# Patient Record
Sex: FEMALE | Employment: FULL TIME | URBAN - METROPOLITAN AREA
[De-identification: names, ages, dates, MRNs, and addresses within clinical notes are randomized per-mention and may not be internally consistent; named-entity substitution may affect disease eponyms.]

---

## 2022-06-06 NOTE — PROGRESS NOTES
The patient is a 23 y.o. No obstetric history on file. who is seen for STD screening. Patient has no symptoms and no known exposure. She states she prefers to have regular screenings every three out of caution. HISTORY:    No obstetric history on file. Last LMP: 5/22/2022  Sexual History:  1 partner in the last three months  Contraception:  Not taking birth control  Current Outpatient Medications on File Prior to Visit   Medication Sig Dispense Refill    acyclovir (ZOVIRAX) 400 MG tablet Take 400 mg by mouth 2 times daily       No current facility-administered medications on file prior to visit. ROS:  Feeling well. No dyspnea or chest pain on exertion. No abdominal pain, change in bowel habits, black or bloody stools. No urinary tract symptoms. GYN ROS: see above. PHYSICAL EXAM:  Blood pressure 118/74, height 5' 7\" (1.702 m), weight 155 lb 12.8 oz (70.7 kg), last menstrual period 05/22/2022. The patient appears well, alert, oriented x 3, in no distress. Lungs are clear. Heart RRR, no murmurs. Abdomen soft without tenderness, guarding, mass or organomegaly. Pelvic: normal external genitalia, vulva, vagina, cervix, uterus and adnexa, VULVA: normal appearing vulva with no masses, tenderness or lesions, VAGINA: normal appearing vagina with normal color and discharge, no lesions, CERVIX: normal appearing cervix without discharge or lesions. ASSESSMENT:  Encounter Diagnosis   Name Primary?     Screening examination for STD (sexually transmitted disease) Yes       PLAN:  All questions answered  Swab for screening  Would like blood testing also  Will call pt with results and manage accordingly         Orders Placed This Encounter   Procedures    Nuswab Vaginitis Plus (VG+)     Standing Status:   Future     Number of Occurrences:   1     Standing Expiration Date:   6/7/2023    Hepatitis B Surface Antigen     Standing Status:   Future     Number of Occurrences:   1     Standing Expiration Date: 6/7/2023    Hepatitis C Antibody     Standing Status:   Future     Number of Occurrences:   1     Standing Expiration Date:   6/7/2023    HIV 1/2 Ag/Ab, 4TH Generation,W Rflx Confirm     Standing Status:   Future     Number of Occurrences:   1     Standing Expiration Date:   6/7/2023    RPR     Standing Status:   Future     Number of Occurrences:   1     Standing Expiration Date:   6/7/2023         Supervising physician is Dr. Nicolle Humphries than 50% of the 15 minute visit were spent in counseling to the above topics.

## 2022-06-07 ENCOUNTER — OFFICE VISIT (OUTPATIENT)
Dept: OBGYN CLINIC | Age: 27
End: 2022-06-07
Payer: COMMERCIAL

## 2022-06-07 VITALS
BODY MASS INDEX: 24.45 KG/M2 | DIASTOLIC BLOOD PRESSURE: 74 MMHG | SYSTOLIC BLOOD PRESSURE: 118 MMHG | HEIGHT: 67 IN | WEIGHT: 155.8 LBS

## 2022-06-07 DIAGNOSIS — Z11.3 SCREENING EXAMINATION FOR STD (SEXUALLY TRANSMITTED DISEASE): Primary | ICD-10-CM

## 2022-06-07 PROCEDURE — G8420 CALC BMI NORM PARAMETERS: HCPCS | Performed by: NURSE PRACTITIONER

## 2022-06-07 PROCEDURE — G8427 DOCREV CUR MEDS BY ELIG CLIN: HCPCS | Performed by: NURSE PRACTITIONER

## 2022-06-07 PROCEDURE — 4004F PT TOBACCO SCREEN RCVD TLK: CPT | Performed by: NURSE PRACTITIONER

## 2022-06-07 PROCEDURE — 99213 OFFICE O/P EST LOW 20 MIN: CPT | Performed by: NURSE PRACTITIONER

## 2022-06-08 LAB
HBV SURFACE AG SER QL: NONREACTIVE
HCV AB SER QL: NONREACTIVE
HIV 1+2 AB+HIV1 P24 AG SERPL QL IA: NONREACTIVE
HIV 1/2 RESULT COMMENT: NORMAL
RPR SER QL: NONREACTIVE

## 2022-06-15 LAB
A VAGINAE DNA VAG QL NAA+PROBE: ABNORMAL SCORE
BVAB2 DNA VAG QL NAA+PROBE: ABNORMAL SCORE
C ALBICANS DNA VAG QL NAA+PROBE: POSITIVE
C GLABRATA DNA VAG QL NAA+PROBE: NEGATIVE
C TRACH RRNA SPEC QL NAA+PROBE: NEGATIVE
MEGA1 DNA VAG QL NAA+PROBE: ABNORMAL SCORE
N GONORRHOEA RRNA SPEC QL NAA+PROBE: NEGATIVE
SPECIMEN SOURCE: ABNORMAL
T VAGINALIS RRNA SPEC QL NAA+PROBE: NEGATIVE

## 2022-06-16 ENCOUNTER — TELEPHONE (OUTPATIENT)
Dept: OBGYN CLINIC | Age: 27
End: 2022-06-16

## 2022-06-16 RX ORDER — FLUCONAZOLE 150 MG/1
150 TABLET ORAL
Qty: 2 TABLET | Refills: 0 | Status: SHIPPED | OUTPATIENT
Start: 2022-06-16

## 2022-06-16 RX ORDER — METRONIDAZOLE 500 MG/1
500 TABLET ORAL 2 TIMES DAILY
Qty: 14 TABLET | Refills: 0 | Status: SHIPPED | OUTPATIENT
Start: 2022-06-16 | End: 2022-06-23

## 2022-06-16 NOTE — TELEPHONE ENCOUNTER
Orders Placed This Encounter    metroNIDAZOLE (FLAGYL) 500 MG tablet     Sig: Take 1 tablet by mouth in the morning and at bedtime for 7 days     Dispense:  14 tablet     Refill:  0    fluconazole (DIFLUCAN) 150 MG tablet     Sig: Take 1 tablet by mouth every 72 hours     Dispense:  2 tablet     Refill:  0

## 2022-06-16 NOTE — TELEPHONE ENCOUNTER
Pt calling for lab results from appointment on 6/7/22. Melly Faust, APRN - CNP   6/15/2022  5:04 PM EDT Back to Top        BV and yeast  Flagyl 500 BID x7 days  Diflucan 150 PO x2 doses 72 hours apart     Spoke with pt and she is aware of rx and recommendations. Pt asked if there is anything she can do to help prevent getting BV. Advised that she could try vaginal probiotic, rephresh, may want to consider wearing only cotton underwear as well. All questions answered and pt advised to call back PRN.

## 2022-10-18 ENCOUNTER — OFFICE VISIT (OUTPATIENT)
Dept: OBGYN CLINIC | Age: 27
End: 2022-10-18
Payer: COMMERCIAL

## 2022-10-18 VITALS
SYSTOLIC BLOOD PRESSURE: 108 MMHG | WEIGHT: 146.2 LBS | HEIGHT: 67 IN | BODY MASS INDEX: 22.95 KG/M2 | DIASTOLIC BLOOD PRESSURE: 67 MMHG

## 2022-10-18 DIAGNOSIS — Z11.3 ROUTINE SCREENING FOR STI (SEXUALLY TRANSMITTED INFECTION): Primary | ICD-10-CM

## 2022-10-18 DIAGNOSIS — N89.8 VAGINAL LESION: ICD-10-CM

## 2022-10-18 DIAGNOSIS — A60.04 HERPES SIMPLEX VULVOVAGINITIS: ICD-10-CM

## 2022-10-18 PROCEDURE — G8484 FLU IMMUNIZE NO ADMIN: HCPCS | Performed by: NURSE PRACTITIONER

## 2022-10-18 PROCEDURE — 4004F PT TOBACCO SCREEN RCVD TLK: CPT | Performed by: NURSE PRACTITIONER

## 2022-10-18 PROCEDURE — 99213 OFFICE O/P EST LOW 20 MIN: CPT | Performed by: NURSE PRACTITIONER

## 2022-10-18 PROCEDURE — G8420 CALC BMI NORM PARAMETERS: HCPCS | Performed by: NURSE PRACTITIONER

## 2022-10-18 PROCEDURE — G8427 DOCREV CUR MEDS BY ELIG CLIN: HCPCS | Performed by: NURSE PRACTITIONER

## 2022-10-18 RX ORDER — ACYCLOVIR 400 MG/1
800 TABLET ORAL 3 TIMES DAILY
Qty: 12 TABLET | Refills: 5 | Status: SHIPPED | OUTPATIENT
Start: 2022-10-18 | End: 2022-10-20

## 2022-10-18 NOTE — PROGRESS NOTES
The patient is a 23 y.o. Nuno Monae who is seen for STI Testing. PT denies any sx/known exposure. today but prefers routine STI screening q 3mo (serum and vaginal). Would like refill of acyclovir today. Pt has active outbreak of genital herpes, which started yesterday. Notes 1 lesion thus far, but is out of refills. HISTORY:      No LMP recorded (lmp unknown). Sexual History:  has sex with males  Contraception:  none  Current Outpatient Medications on File Prior to Visit   Medication Sig Dispense Refill    acyclovir (ZOVIRAX) 400 MG tablet Take 400 mg by mouth 2 times daily      fluconazole (DIFLUCAN) 150 MG tablet Take 1 tablet by mouth every 72 hours (Patient not taking: Reported on 10/18/2022) 2 tablet 0     No current facility-administered medications on file prior to visit. ROS:  Feeling well. GYN ROS: see above. PHYSICAL EXAM:  Blood pressure 108/67, height 5' 7\" (1.702 m), weight 146 lb 3.2 oz (66.3 kg). The patient appears well, alert, oriented x 3, in no distress. Pelvic: VULVA: normal appearing vulva with no masses, tenderness or lesions, VAGINA: 1 small ulcerative lesion noted to R labia majora; otherwise normal appearing vagina with normal color and discharge, no lesions, CERVIX: normal appearing cervix without discharge or lesions. ASSESSMENT:  Encounter Diagnoses   Name Primary?     Routine screening for STI (sexually transmitted infection) Yes    Herpes simplex vulvovaginitis     Vaginal lesion        PLAN:  All questions answered  Acyclovir refills  NuSwab vaginitis plus  Would like blood testing also  Will call pt with results and manage accordingly     Orders Placed This Encounter   Procedures    Nuswab Vaginitis Plus (VG+)     Standing Status:   Future     Standing Expiration Date:   10/18/2023    Hepatitis B Surface Antigen     Standing Status:   Future     Standing Expiration Date:   10/18/2023    Hepatitis C Ab, Rflx to Qt by PCR     Standing Status:   Future Standing Expiration Date:   10/18/2023    HIV 1/2 Ag/Ab, 4TH Generation,W Rflx Confirm     Standing Status:   Future     Standing Expiration Date:   10/18/2023    RPR     Standing Status:   Future     Standing Expiration Date:   10/18/2023         Supervising physician is Dr. Victor Manuel Murdock  Greater than 50% of the 20 minute visit were spent in counseling to the above topics.

## 2022-10-19 LAB
HIV 1+2 AB+HIV1 P24 AG SERPL QL IA: NONREACTIVE
HIV 1/2 RESULT COMMENT: NORMAL
RPR SER QL: NONREACTIVE

## 2022-10-20 LAB
HBV SURFACE AG SER QL: NONREACTIVE
HCV AB S/CO SERPL IA: <0.1 S/CO RATIO (ref 0–0.9)
HCV AB SERPL QL IA: NORMAL

## 2022-10-26 RX ORDER — FLUCONAZOLE 150 MG/1
TABLET ORAL
Qty: 2 TABLET | Refills: 0 | Status: SHIPPED | OUTPATIENT
Start: 2022-10-26

## 2023-01-24 ENCOUNTER — OFFICE VISIT (OUTPATIENT)
Dept: OBGYN CLINIC | Age: 28
End: 2023-01-24
Payer: COMMERCIAL

## 2023-01-24 VITALS
SYSTOLIC BLOOD PRESSURE: 110 MMHG | BODY MASS INDEX: 23.95 KG/M2 | DIASTOLIC BLOOD PRESSURE: 70 MMHG | WEIGHT: 152.6 LBS | HEIGHT: 67 IN

## 2023-01-24 DIAGNOSIS — Z11.3 SCREENING FOR STD (SEXUALLY TRANSMITTED DISEASE): Primary | ICD-10-CM

## 2023-01-24 DIAGNOSIS — Z72.51 UNPROTECTED SEX: ICD-10-CM

## 2023-01-24 PROCEDURE — G8420 CALC BMI NORM PARAMETERS: HCPCS | Performed by: NURSE PRACTITIONER

## 2023-01-24 PROCEDURE — G8484 FLU IMMUNIZE NO ADMIN: HCPCS | Performed by: NURSE PRACTITIONER

## 2023-01-24 PROCEDURE — G8427 DOCREV CUR MEDS BY ELIG CLIN: HCPCS | Performed by: NURSE PRACTITIONER

## 2023-01-24 PROCEDURE — 99213 OFFICE O/P EST LOW 20 MIN: CPT | Performed by: NURSE PRACTITIONER

## 2023-01-24 PROCEDURE — 1036F TOBACCO NON-USER: CPT | Performed by: NURSE PRACTITIONER

## 2023-01-24 NOTE — PROGRESS NOTES
The patient is a 33 y.o. Bee Jenkins who is seen for STD screening. Wants full screening today. Denies any symptoms or known exposure. No new partners. Likes to have screening done every 3mo. HISTORY:    [id number]  Patient's last menstrual period was 01/02/2023 (approximate). Sexual History:  has sex with males  Contraception:  None  Current Outpatient Medications on File Prior to Visit   Medication Sig Dispense Refill    acyclovir (ZOVIRAX) 400 MG tablet Take 400 mg by mouth 2 times daily       No current facility-administered medications on file prior to visit. ROS:  Feeling well. No dyspnea or chest pain on exertion. No abdominal pain, change in bowel habits, black or bloody stools. No urinary tract symptoms. GYN ROS: see above. PHYSICAL EXAM:  Blood pressure 110/70, height 5' 7\" (1.702 m), weight 152 lb 9.6 oz (69.2 kg), last menstrual period 01/02/2023. The patient appears well, alert, oriented x 3, in no distress. Lungs are clear. Heart RRR, no murmurs. Abdomen soft without tenderness, guarding, mass or organomegaly. Pelvic: normal external genitalia, vulva, vagina, cervix, uterus and adnexa, VULVA: normal appearing vulva with no masses, tenderness or lesions, VAGINA: normal appearing vagina with normal color and discharge, no lesions, CERVIX: normal appearing cervix without discharge or lesions. ASSESSMENT:  Encounter Diagnoses   Name Primary?     Screening for STD (sexually transmitted disease) Yes    Unprotected sex        PLAN:  All questions answered  Swab for screening  Would like blood testing also  Will call pt with results and manage accordingly     Orders Placed This Encounter   Procedures    Chlamydia, Gonorrhea, Trichomoniasis     Standing Status:   Future     Number of Occurrences:   1     Standing Expiration Date:   1/24/2024    Hepatitis B Surface Antigen     Standing Status:   Future     Number of Occurrences:   1     Standing Expiration Date:   1/24/2024    Hepatitis C Antibody     Standing Status:   Future     Number of Occurrences:   1     Standing Expiration Date:   1/24/2024    HIV 1/2 Ag/Ab, 4TH Generation,W Rflx Confirm     Standing Status:   Future     Number of Occurrences:   1     Standing Expiration Date:   1/24/2024    RPR     Standing Status:   Future     Number of Occurrences:   1     Standing Expiration Date:   1/24/2024           Supervising physician is Dr. Misty Scheuermann. Greater than 50% of the 20 minute visit were spent in counseling to the above topics.

## 2023-01-26 LAB
C TRACH RRNA SPEC QL NAA+PROBE: NEGATIVE
N GONORRHOEA RRNA SPEC QL NAA+PROBE: NEGATIVE
SPECIMEN SOURCE: NORMAL
T VAGINALIS RRNA SPEC QL NAA+PROBE: NEGATIVE

## 2023-04-04 NOTE — PROGRESS NOTES
Hepatitis B Surface Antigen; Future  - RPR; Future    2. Screening for HPV (human papillomavirus)    - PAP IG, CT-NG-TV, Aptima HPV and rfx 16/18,45 ([id number]); Future    3. Screening for cervical cancer    - PAP IG, CT-NG-TV, Aptima HPV and rfx 16/18,45 ([id number]); Future    4. Screening for genitourinary condition    - AMB POC URINALYSIS DIP STICK AUTO W/O MICRO    5. Screening for STD (sexually transmitted disease)    - PAP IG, CT-NG-TV, Aptima HPV and rfx 16/18,45 ([id number]); Future  - HIV 1/2 Ag/Ab, 4TH Generation,W Rflx Confirm; Future  - Hepatitis C Antibody; Future  - Hepatitis B Surface Antigen; Future  - RPR; Future       pap smear with cultures  Would like blood testing also (denies symptoms/known exposure)  Denies need for med refills  return annually or prn    Supervising physician is Dr. Diego Aguirre.     Etienne Connolly, EARL - CNP

## 2023-04-05 ENCOUNTER — OFFICE VISIT (OUTPATIENT)
Dept: OBGYN CLINIC | Age: 28
End: 2023-04-05
Payer: COMMERCIAL

## 2023-04-05 VITALS
HEIGHT: 67 IN | BODY MASS INDEX: 23.32 KG/M2 | SYSTOLIC BLOOD PRESSURE: 110 MMHG | DIASTOLIC BLOOD PRESSURE: 72 MMHG | WEIGHT: 148.6 LBS

## 2023-04-05 DIAGNOSIS — Z11.3 SCREENING FOR STD (SEXUALLY TRANSMITTED DISEASE): ICD-10-CM

## 2023-04-05 DIAGNOSIS — Z11.51 SCREENING FOR HPV (HUMAN PAPILLOMAVIRUS): ICD-10-CM

## 2023-04-05 DIAGNOSIS — Z12.4 SCREENING FOR CERVICAL CANCER: ICD-10-CM

## 2023-04-05 DIAGNOSIS — Z13.89 SCREENING FOR GENITOURINARY CONDITION: ICD-10-CM

## 2023-04-05 DIAGNOSIS — Z01.419 WELL WOMAN EXAM: Primary | ICD-10-CM

## 2023-04-05 LAB
BILIRUBIN, URINE, POC: NEGATIVE
BLOOD URINE, POC: NEGATIVE
GLUCOSE URINE, POC: NEGATIVE
KETONES, URINE, POC: NEGATIVE
LEUKOCYTE ESTERASE, URINE, POC: NEGATIVE
NITRITE, URINE, POC: NEGATIVE
PH, URINE, POC: 7 (ref 4.6–8)
PROTEIN,URINE, POC: NEGATIVE
SPECIFIC GRAVITY, URINE, POC: 1.01 (ref 1–1.03)
URINALYSIS CLARITY, POC: CLEAR
URINALYSIS COLOR, POC: YELLOW
UROBILINOGEN, POC: NORMAL

## 2023-04-05 PROCEDURE — 99395 PREV VISIT EST AGE 18-39: CPT | Performed by: NURSE PRACTITIONER

## 2023-04-05 PROCEDURE — 81003 URINALYSIS AUTO W/O SCOPE: CPT | Performed by: NURSE PRACTITIONER

## 2023-04-08 LAB
HBV SURFACE AG SER QL: NONREACTIVE
HCV AB SER QL: NONREACTIVE

## 2023-05-23 ENCOUNTER — OFFICE VISIT (OUTPATIENT)
Dept: OBGYN CLINIC | Age: 28
End: 2023-05-23
Payer: COMMERCIAL

## 2023-05-23 VITALS
HEIGHT: 67 IN | SYSTOLIC BLOOD PRESSURE: 112 MMHG | DIASTOLIC BLOOD PRESSURE: 78 MMHG | WEIGHT: 157.8 LBS | BODY MASS INDEX: 24.77 KG/M2

## 2023-05-23 DIAGNOSIS — Z72.51 UNPROTECTED SEX: ICD-10-CM

## 2023-05-23 DIAGNOSIS — Z20.2 POTENTIAL EXPOSURE TO STD: Primary | ICD-10-CM

## 2023-05-23 PROCEDURE — G8427 DOCREV CUR MEDS BY ELIG CLIN: HCPCS | Performed by: NURSE PRACTITIONER

## 2023-05-23 PROCEDURE — 99213 OFFICE O/P EST LOW 20 MIN: CPT | Performed by: NURSE PRACTITIONER

## 2023-05-23 PROCEDURE — 1036F TOBACCO NON-USER: CPT | Performed by: NURSE PRACTITIONER

## 2023-05-23 PROCEDURE — G8420 CALC BMI NORM PARAMETERS: HCPCS | Performed by: NURSE PRACTITIONER

## 2023-05-23 NOTE — PROGRESS NOTES
The patient is a 33 y.o. Daiva Wang who is seen for std testing. Denies symptoms/known exposure. Gets tested routinely. Also notes recently split from partner of 3 years. HISTORY:    [id number]  Patient's last menstrual period was 05/02/2023 (approximate). Sexual History:  has sex with males  Contraception:  condoms  Current Outpatient Medications on File Prior to Visit   Medication Sig Dispense Refill    acyclovir (ZOVIRAX) 400 MG tablet Take 1 tablet by mouth 2 times daily       No current facility-administered medications on file prior to visit. ROS:  Feeling well. No dyspnea or chest pain on exertion. No abdominal pain, change in bowel habits, black or bloody stools. No urinary tract symptoms. GYN ROS: see above. PHYSICAL EXAM:  Blood pressure 112/78, height 5' 7\" (1.702 m), weight 157 lb 12.8 oz (71.6 kg), last menstrual period 05/02/2023. The patient appears well, alert, oriented x 3, in no distress. Lungs are clear. Heart RRR, no murmurs. Abdomen soft without tenderness, guarding, mass or organomegaly. Pelvic: VULVA: normal appearing vulva with no masses, tenderness or lesions, VAGINA: normal appearing vagina with normal color and discharge, no lesions, CERVIX: normal appearing cervix without discharge or lesions. ASSESSMENT:  Encounter Diagnoses   Name Primary?     Potential exposure to STD Yes    Unprotected sex        PLAN:  All questions answered  Swab for STD testing  Would like blood testing also  Will call pt with results and manage accordingly       Orders Placed This Encounter   Procedures    Chlamydia, Gonorrhea, Trichomoniasis     Standing Status:   Future     Number of Occurrences:   1     Standing Expiration Date:   5/23/2024    RPR     Standing Status:   Future     Number of Occurrences:   1     Standing Expiration Date:   5/23/2024    HIV 1/2 Ag/Ab, 4TH Generation,W Rflx Confirm     Standing Status:   Future     Number of Occurrences:   1     Standing Expiration Date:

## 2023-06-11 LAB
A VAGINAE DNA VAG QL NAA+PROBE: NORMAL SCORE
BVAB2 DNA VAG QL NAA+PROBE: NORMAL SCORE
C ALBICANS DNA VAG QL NAA+PROBE: NORMAL
C GLABRATA DNA VAG QL NAA+PROBE: NORMAL
MEGA1 DNA VAG QL NAA+PROBE: NORMAL SCORE
SPECIMEN SOURCE: NORMAL

## 2023-12-26 ENCOUNTER — TELEPHONE (OUTPATIENT)
Dept: OBGYN CLINIC | Age: 28
End: 2023-12-26

## 2023-12-26 NOTE — TELEPHONE ENCOUNTER
Establish patient called and [facility] stating that she is needing a refill on her Acyclovir. Newly pregnant. Attempted to contact patient. No answer. LMOM notifying patient I was sending her a message on [employer].

## 2023-12-27 RX ORDER — ACYCLOVIR 400 MG/1
TABLET ORAL
Qty: 30 TABLET | Refills: 1 | Status: SHIPPED | OUTPATIENT
Start: 2023-12-27

## 2023-12-27 NOTE — TELEPHONE ENCOUNTER
Prescription sent to pharmacy per standing orders.     Orders Placed This Encounter    acyclovir (ZOVIRAX) 400 MG tablet     Sig: Take one tablet by oral route twice daily as needed for genital outbreak     Dispense:  30 tablet     Refill:  1

## 2024-01-09 NOTE — PROGRESS NOTES
The patient is a 28 y.o.  who is seen to discuss her missed menses. Pt denies any current unilateral pain, cramping, urinary symptoms, or vaginal bleeding. She is currently taking an over the counter PNV with DHA and folic acid.     Ultrasound Findings Today: 01/10/2024:       HISTORY:      No LMP recorded.  Sexual History:  {Sexual Hx:5163}  Contraception:  {Contraception Methods:5051}  Current Outpatient Medications on File Prior to Visit   Medication Sig Dispense Refill    acyclovir (ZOVIRAX) 400 MG tablet Take one tablet by oral route twice daily as needed for genital outbreak 30 tablet 1     No current facility-administered medications on file prior to visit.       ROS:  Feeling well. No dyspnea or chest pain on exertion.  No abdominal pain, change in bowel habits, black or bloody stools.  No urinary tract symptoms. GYN ROS: {gyn ros:945180}.    PHYSICAL EXAM:  There were no vitals taken for this visit.    The patient appears well, alert, oriented x 3, in no distress.  Lungs are clear. Heart RRR, no murmurs. Abdomen soft without tenderness, guarding, mass or organomegaly.  Pelvic: {pelvic exam:362911::\"normal external genitalia, vulva, vagina, cervix, uterus and adnexa\"}.    ASSESSMENT:  No diagnosis found.    PLAN:  Dos and Don'ts of pregnancy discussed.  Pain, bleeding, and ectopic precautions given.  Continue PNV.  Return for NOB talk and ultrasound around 8 weeks.      {Gyn plan:74859}  No orders of the defined types were placed in this encounter.          Supervising physician is  ****.  Greater than 50% of the *** minute visit were spent in counseling to the above topics.

## 2024-01-10 ENCOUNTER — PROCEDURE VISIT (OUTPATIENT)
Dept: OBGYN CLINIC | Age: 29
End: 2024-01-10
Payer: MEDICAID

## 2024-01-10 ENCOUNTER — OFFICE VISIT (OUTPATIENT)
Dept: OBGYN CLINIC | Age: 29
End: 2024-01-10
Payer: MEDICAID

## 2024-01-10 VITALS
SYSTOLIC BLOOD PRESSURE: 122 MMHG | BODY MASS INDEX: 27.15 KG/M2 | DIASTOLIC BLOOD PRESSURE: 74 MMHG | HEIGHT: 67 IN | WEIGHT: 173 LBS

## 2024-01-10 DIAGNOSIS — N91.2 AMENORRHEA: Primary | ICD-10-CM

## 2024-01-10 DIAGNOSIS — N92.6 MISSED MENSES: Primary | ICD-10-CM

## 2024-01-10 LAB
HCG, PREGNANCY, URINE, POC: POSITIVE
VALID INTERNAL CONTROL, POC: YES

## 2024-01-10 PROCEDURE — 99213 OFFICE O/P EST LOW 20 MIN: CPT | Performed by: NURSE PRACTITIONER

## 2024-01-10 PROCEDURE — 81025 URINE PREGNANCY TEST: CPT | Performed by: NURSE PRACTITIONER

## 2024-01-10 PROCEDURE — 76830 TRANSVAGINAL US NON-OB: CPT | Performed by: STUDENT IN AN ORGANIZED HEALTH CARE EDUCATION/TRAINING PROGRAM

## 2024-01-10 NOTE — PROGRESS NOTES
The patient is a 28 y.o.  who is seen to discuss her new pregnancy. Pt denies any current unilateral pain, cramping, urinary symptoms, or vaginal bleeding. She is currently taking an over the counter PNV with DHA and folic acid. Prior to conception cycles every 21-45 lasting 5. Previous pregnancy with oligohydramnios, but otherwise no pregnancy complications.     LMP:23  FLORENTIN based off of LMP: 24  GA today: 8w5d    US findings from today 1/10/24  SINGLE IUP NOTED WITH A FHR= 176 BPM  CRL IS CONSISTENT WITH FLORENTIN DETERMINED BY LMP = 2024  YS VISUALIZED  ROV APPEARS WNL, CLC  LOV APPEARS WNL  UTERUS ANTEVERTED  CX APPEARS WNL  NO FREE FLUID OR ADNEXAL MASS  THIN NATANAEL SEEN SURROUNDING GEST SAC= 7.2 X 1.2 X 2.5 CM  PT ADMITS TO [facility] AND MADE AWARE OF NATANAEL  HISTORY:      Patient's last menstrual period was 2023 (exact date).  Sexual History:  has sex with males  Contraception:  none  Current Outpatient Medications on File Prior to Visit   Medication Sig Dispense Refill    acyclovir (ZOVIRAX) 400 MG tablet Take one tablet by oral route twice daily as needed for genital outbreak 30 tablet 1     No current facility-administered medications on file prior to visit.       ROS:  Feeling well. No dyspnea or chest pain on exertion.  No abdominal pain, change in bowel habits, black or bloody stools.  No urinary tract symptoms. GYN ROS: see above.    PHYSICAL EXAM:  Blood pressure 122/74, height 1.702 m (5' 7\"), weight 78.5 kg (173 lb), last menstrual period 2023.    The patient appears well, alert, oriented x 3, in no distress.    ASSESSMENT:  Encounter Diagnosis   Name Primary?    Amenorrhea Yes       PLAN:  All questions answered  Diagnosis explained in detail, including differential  US images and findings reviewed with pt.   Reassured of IUP with + FHT= 176  YS seen  NATANAEL reviewed with Dr. Whitt and with pt- given size of NATANAEL and increased risk for SAB, discussed could still reabsorb as

## 2024-01-25 ENCOUNTER — PROCEDURE VISIT (OUTPATIENT)
Dept: OBGYN CLINIC | Age: 29
End: 2024-01-25
Payer: MEDICAID

## 2024-01-25 ENCOUNTER — INITIAL PRENATAL (OUTPATIENT)
Dept: OBGYN CLINIC | Age: 29
End: 2024-01-25

## 2024-01-25 VITALS — HEIGHT: 67 IN | BODY MASS INDEX: 26.34 KG/M2 | WEIGHT: 167.8 LBS

## 2024-01-25 DIAGNOSIS — O36.80X0 ENCOUNTER TO DETERMINE FETAL VIABILITY OF PREGNANCY, SINGLE OR UNSPECIFIED FETUS: Primary | ICD-10-CM

## 2024-01-25 DIAGNOSIS — Z3A.10 10 WEEKS GESTATION OF PREGNANCY: ICD-10-CM

## 2024-01-25 DIAGNOSIS — Z34.81 PRENATAL CARE, SUBSEQUENT PREGNANCY, FIRST TRIMESTER: Primary | ICD-10-CM

## 2024-01-25 PROBLEM — Z34.90 PREGNANCY: Status: ACTIVE | Noted: 2024-01-25

## 2024-01-25 LAB
ABO + RH BLD: NORMAL
BASOPHILS # BLD: 0 K/UL (ref 0–0.2)
BASOPHILS NFR BLD: 0 % (ref 0–2)
BLOOD GROUP ANTIBODIES SERPL: NORMAL
DIFFERENTIAL METHOD BLD: ABNORMAL
EOSINOPHIL # BLD: 0 K/UL (ref 0–0.8)
EOSINOPHIL NFR BLD: 1 % (ref 0.5–7.8)
ERYTHROCYTE [DISTWIDTH] IN BLOOD BY AUTOMATED COUNT: 12.1 % (ref 11.9–14.6)
HBV SURFACE AG SER QL: NONREACTIVE
HCT VFR BLD AUTO: 37.5 % (ref 35.8–46.3)
HCV AB SER QL: NONREACTIVE
HGB BLD-MCNC: 12 G/DL (ref 11.7–15.4)
HIV 1+2 AB+HIV1 P24 AG SERPL QL IA: NONREACTIVE
HIV 1/2 RESULT COMMENT: NORMAL
IMM GRANULOCYTES # BLD AUTO: 0 K/UL (ref 0–0.5)
IMM GRANULOCYTES NFR BLD AUTO: 0 % (ref 0–5)
LYMPHOCYTES # BLD: 1.3 K/UL (ref 0.5–4.6)
LYMPHOCYTES NFR BLD: 31 % (ref 13–44)
MCH RBC QN AUTO: 27.5 PG (ref 26.1–32.9)
MCHC RBC AUTO-ENTMCNC: 32 G/DL (ref 31.4–35)
MCV RBC AUTO: 85.8 FL (ref 82–102)
MONOCYTES # BLD: 0.3 K/UL (ref 0.1–1.3)
MONOCYTES NFR BLD: 7 % (ref 4–12)
NEUTS SEG # BLD: 2.5 K/UL (ref 1.7–8.2)
NEUTS SEG NFR BLD: 61 % (ref 43–78)
NRBC # BLD: 0 K/UL (ref 0–0.2)
PLATELET # BLD AUTO: 308 K/UL (ref 150–450)
PMV BLD AUTO: 9.8 FL (ref 9.4–12.3)
RBC # BLD AUTO: 4.37 M/UL (ref 4.05–5.2)
RUBV IGG SERPL IA-ACNC: 21.1 IU/ML
WBC # BLD AUTO: 4.2 K/UL (ref 4.3–11.1)

## 2024-01-25 PROCEDURE — 76817 TRANSVAGINAL US OBSTETRIC: CPT | Performed by: OBSTETRICS & GYNECOLOGY

## 2024-01-25 NOTE — PROGRESS NOTES
Sarah Reilly G3, P1 presents to the office today for NOB talk and ultrasound. EDC is 8/17/24 based off of LMP.     Patient education was discussed including: nutrition, appropriate weight gain, diet, exercise, travel, hospital classes, breastfeeding/lactation services, flu vaccine, Tdap, glucola, GBS, and Corona Virus and Zika precautions.     Genetic testing discussed in depth and patient elects NIPT. Patients past medical history is significant for genital herpes.  States G1-oligo at 40w5d-had IOL.  Otherwise normal pregnancy and delivery.    She is to return to the office in 2 weeks for NOB exam. All questions answered and she voiced full understanding. She is encouraged to call the office with any questions or concerns.

## 2024-01-26 LAB
RPR SER QL: NONREACTIVE
VZV IGG SER IA-ACNC: 1268 INDEX

## 2024-01-28 LAB
BACTERIA SPEC CULT: NORMAL
BACTERIA SPEC CULT: NORMAL
SERVICE CMNT-IMP: NORMAL

## 2024-01-29 PROBLEM — O26.899 RH NEGATIVE STATUS DURING PREGNANCY: Status: ACTIVE | Noted: 2024-01-29

## 2024-01-29 PROBLEM — Z67.91 RH NEGATIVE STATUS DURING PREGNANCY: Status: ACTIVE | Noted: 2024-01-29

## 2024-01-29 LAB
HGB A MFR BLD: 97.3 % (ref 96.4–98.8)
HGB A2 MFR BLD COLUMN CHROM: 2.7 % (ref 1.8–3.2)
HGB F MFR BLD: 0 % (ref 0–2)
HGB FRACT BLD-IMP: NORMAL
HGB S MFR BLD: 0 %

## 2024-02-07 ENCOUNTER — ROUTINE PRENATAL (OUTPATIENT)
Dept: OBGYN CLINIC | Age: 29
End: 2024-02-07
Payer: MEDICAID

## 2024-02-07 VITALS — WEIGHT: 169.8 LBS | BODY MASS INDEX: 26.59 KG/M2 | DIASTOLIC BLOOD PRESSURE: 60 MMHG | SYSTOLIC BLOOD PRESSURE: 108 MMHG

## 2024-02-07 DIAGNOSIS — Z34.81 PRENATAL CARE, SUBSEQUENT PREGNANCY, FIRST TRIMESTER: Primary | ICD-10-CM

## 2024-02-07 DIAGNOSIS — Z13.89 SCREENING FOR GENITOURINARY CONDITION: ICD-10-CM

## 2024-02-07 DIAGNOSIS — Z11.3 SCREENING EXAMINATION FOR STD (SEXUALLY TRANSMITTED DISEASE): ICD-10-CM

## 2024-02-07 DIAGNOSIS — Z3A.12 12 WEEKS GESTATION OF PREGNANCY: ICD-10-CM

## 2024-02-07 DIAGNOSIS — Z13.79 GENETIC TESTING: ICD-10-CM

## 2024-02-07 LAB
GLUCOSE URINE, POC: NEGATIVE
PROTEIN,URINE, POC: NEGATIVE

## 2024-02-07 PROCEDURE — 99204 OFFICE O/P NEW MOD 45 MIN: CPT | Performed by: OBSTETRICS & GYNECOLOGY

## 2024-02-07 PROCEDURE — 81002 URINALYSIS NONAUTO W/O SCOPE: CPT | Performed by: OBSTETRICS & GYNECOLOGY

## 2024-02-07 NOTE — PROGRESS NOTES
Occupational History    Works at [email or web address] - will stay home after the baby    Tobacco Use    Smoking status: Never    Smokeless tobacco: Never   Vaping Use    Vaping Use: Never used   Substance and Sexual Activity    Alcohol use: No    Drug use: No    Sexual activity: Yes     Partners: Male     Birth control/protection: Condom   Other Topics Concern    Exercise:  will start back   Social History Narrative    Not on file     Social Determinants of Health     Financial Resource Strain: Not on file   Food Insecurity: Not on file   Transportation Needs: Not on file   Physical Activity: Not on file   Stress: Not on file   Social Connections: Not on file   Intimate Partner Violence: Not on file   Housing Stability: Not on file       Family History:  Family History   Problem Relation Age of Onset    No Known Problems Paternal Grandfather     No Known Problems Paternal Grandmother     Kidney Disease Maternal Grandmother     Cancer Maternal Grandmother         Skin Ca    No Known Problems Father     Heart Disease Maternal Grandfather     Hypertension Mother     Cancer Mother         Skin Ca           Review of Systems      A comprehensive review of systems was negative except for that written in the history of present illness.     /60   Wt 77 kg (169 lb 12.8 oz)   LMP 11/11/2023 (Exact Date)   BMI 26.59 kg/m²   Protein, Urine, POC   Date Value Ref Range Status   02/07/2024 Negative  Final     General: well nourished well developed female in nad   Heart rrr  Lungs cta b&s  Abdomen soft nontender.  No enlargement of liver.    Extremities: no calf pain  Pelvic exam: normal external genitalia, vulva, vagina, cervix, uterus and adnexa.  Breasts: breasts appear normal, no suspicious masses, no skin or nipple changes or axillary nodes.      Assessment and Plan:     Sarah was seen today for routine prenatal visit.    Diagnoses and all orders for this visit:    Prenatal care, subsequent pregnancy, first trimester  -

## 2024-03-03 SDOH — ECONOMIC STABILITY: TRANSPORTATION INSECURITY
IN THE PAST 12 MONTHS, HAS LACK OF TRANSPORTATION KEPT YOU FROM MEETINGS, WORK, OR FROM GETTING THINGS NEEDED FOR DAILY LIVING?: NO

## 2024-03-03 SDOH — ECONOMIC STABILITY: FOOD INSECURITY: WITHIN THE PAST 12 MONTHS, YOU WORRIED THAT YOUR FOOD WOULD RUN OUT BEFORE YOU GOT MONEY TO BUY MORE.: NEVER TRUE

## 2024-03-03 SDOH — ECONOMIC STABILITY: HOUSING INSECURITY
IN THE LAST 12 MONTHS, WAS THERE A TIME WHEN YOU DID NOT HAVE A STEADY PLACE TO SLEEP OR SLEPT IN A SHELTER (INCLUDING NOW)?: NO

## 2024-03-03 SDOH — ECONOMIC STABILITY: INCOME INSECURITY: HOW HARD IS IT FOR YOU TO PAY FOR THE VERY BASICS LIKE FOOD, HOUSING, MEDICAL CARE, AND HEATING?: NOT HARD AT ALL

## 2024-03-03 SDOH — ECONOMIC STABILITY: FOOD INSECURITY: WITHIN THE PAST 12 MONTHS, THE FOOD YOU BOUGHT JUST DIDN'T LAST AND YOU DIDN'T HAVE MONEY TO GET MORE.: NEVER TRUE

## 2024-03-05 NOTE — PROGRESS NOTES
28 y.o.  at 16w3d  who is here for routine OB visit.  Pt is doing well, with no bleeding or complications.     HISTORY:  OB History    Para Term  AB Living   3 1 1 0 1 1   SAB IAB Ectopic Molar Multiple Live Births     1       1      # Outcome Date GA Lbr Prieto/2nd Weight Sex Delivery Anes PTL Lv   3 Current            2 Term 18 40w5d 29:45 / 02:14 3.04 kg (6 lb 11.2 oz) F Vag-Spont EPI N COREY      Complications: 40 weeks gestation of pregnancy, Oligohydramnios, antepartum   1 IAB 12              Patient's last menstrual period was 2023 (exact date).  Social History     Substance and Sexual Activity   Sexual Activity Yes    Partners: Male    Birth control/protection: Condom      Past Medical History:   Diagnosis Date    Asthma     outgrew in childhood    Genital herpes       Past Surgical History:   Procedure Laterality Date    ANTERIOR CRUCIATE LIGAMENT REPAIR      OTHER SURGICAL HISTORY      salivary gland excision    WISDOM TOOTH EXTRACTION         ROS:  Feeling well. No dyspnea or chest pain on exertion.  No abdominal pain, change in bowel habits, black or bloody stools.  No urinary tract symptoms.   OB ROS: No vaginal bleeding, cxns, LOF, decreased FM. No HA, vision changes, abdominal pain.    PHYSICAL EXAM:  /60   Wt 78.5 kg (173 lb 1.6 oz)   LMP 2023 (Exact Date)   BMI 27.11 kg/m²        ASSESSMENT / PLAN:  1. Prenatal care, subsequent pregnancy in second trimester  -     AMB POC OB URINE DIP  2. 16 weeks gestation of pregnancy  Overview:  NIPT:low risk  GTT:  Flu:  Tdap:  Orders:  -     AMB POC OB URINE DIP  3. Herpes simplex vulvovaginitis  Overview:  Acyclovir daily.  4. Rh negative status during pregnancy in second trimester  Overview:  ABS-    Rhogam-  5. Screening for genitourinary condition  -     AMB POC OB URINE DIP         Mar Whitt MD

## 2024-03-06 ENCOUNTER — ROUTINE PRENATAL (OUTPATIENT)
Dept: OBGYN CLINIC | Age: 29
End: 2024-03-06
Payer: MEDICAID

## 2024-03-06 VITALS — SYSTOLIC BLOOD PRESSURE: 114 MMHG | BODY MASS INDEX: 27.11 KG/M2 | DIASTOLIC BLOOD PRESSURE: 60 MMHG | WEIGHT: 173.1 LBS

## 2024-03-06 DIAGNOSIS — Z3A.16 16 WEEKS GESTATION OF PREGNANCY: ICD-10-CM

## 2024-03-06 DIAGNOSIS — Z34.82 PRENATAL CARE, SUBSEQUENT PREGNANCY IN SECOND TRIMESTER: Primary | ICD-10-CM

## 2024-03-06 DIAGNOSIS — Z13.89 SCREENING FOR GENITOURINARY CONDITION: ICD-10-CM

## 2024-03-06 DIAGNOSIS — O26.892 RH NEGATIVE STATUS DURING PREGNANCY IN SECOND TRIMESTER: ICD-10-CM

## 2024-03-06 DIAGNOSIS — Z67.91 RH NEGATIVE STATUS DURING PREGNANCY IN SECOND TRIMESTER: ICD-10-CM

## 2024-03-06 DIAGNOSIS — A60.04 HERPES SIMPLEX VULVOVAGINITIS: ICD-10-CM

## 2024-03-06 LAB
GLUCOSE URINE, POC: NEGATIVE
PROTEIN,URINE, POC: NEGATIVE

## 2024-03-06 PROCEDURE — 81002 URINALYSIS NONAUTO W/O SCOPE: CPT | Performed by: STUDENT IN AN ORGANIZED HEALTH CARE EDUCATION/TRAINING PROGRAM

## 2024-03-06 PROCEDURE — 99213 OFFICE O/P EST LOW 20 MIN: CPT | Performed by: STUDENT IN AN ORGANIZED HEALTH CARE EDUCATION/TRAINING PROGRAM

## 2024-03-13 ENCOUNTER — TELEPHONE (OUTPATIENT)
Dept: OBGYN CLINIC | Age: 29
End: 2024-03-13

## 2024-03-13 ENCOUNTER — ROUTINE PRENATAL (OUTPATIENT)
Dept: OBGYN CLINIC | Age: 29
End: 2024-03-13

## 2024-03-13 VITALS — WEIGHT: 171.4 LBS | SYSTOLIC BLOOD PRESSURE: 106 MMHG | BODY MASS INDEX: 26.85 KG/M2 | DIASTOLIC BLOOD PRESSURE: 60 MMHG

## 2024-03-13 DIAGNOSIS — N94.9 VAGINAL BURNING: ICD-10-CM

## 2024-03-13 DIAGNOSIS — N90.89 VULVAR IRRITATION: Primary | ICD-10-CM

## 2024-03-13 DIAGNOSIS — N89.8 VAGINAL ITCHING: ICD-10-CM

## 2024-03-13 DIAGNOSIS — Z3A.17 17 WEEKS GESTATION OF PREGNANCY: ICD-10-CM

## 2024-03-13 DIAGNOSIS — Z33.1 PREGNANCY, INCIDENTAL: ICD-10-CM

## 2024-03-13 LAB
BILIRUBIN, URINE, POC: NEGATIVE
BLOOD URINE, POC: NEGATIVE
GLUCOSE URINE, POC: NEGATIVE
KETONES, URINE, POC: NEGATIVE
LEUKOCYTE ESTERASE, URINE, POC: NEGATIVE
NITRITE, URINE, POC: NEGATIVE
PH, URINE, POC: 7.5 (ref 4.6–8)
PROTEIN,URINE, POC: NEGATIVE
SPECIFIC GRAVITY, URINE, POC: 1.02 (ref 1–1.03)
URINALYSIS CLARITY, POC: CLEAR
URINALYSIS COLOR, POC: YELLOW
UROBILINOGEN, POC: NORMAL

## 2024-03-13 NOTE — PROGRESS NOTES
with water instead of soap    Orders Placed This Encounter   Procedures    AMB POC URINALYSIS DIP STICK MANUAL W/O MICRO     Patient reassured.    Supervising physician is Dr. Rubio.  30 min chart review, counseling and documentation

## 2024-03-13 NOTE — TELEPHONE ENCOUNTER
OB patient called at 17.4 weeks pregnant stating she is having symptoms she is concerned about.  She complains of possible UTI vs vaginal infection.  States having vaginal itching/odor x 2 days.  Patient informed would need appt to evaluate.  Patient given appointment time for today to be evaluated.

## 2024-03-16 LAB
A VAGINAE DNA VAG QL NAA+PROBE: ABNORMAL SCORE
BVAB2 DNA VAG QL NAA+PROBE: ABNORMAL SCORE
C ALBICANS DNA VAG QL NAA+PROBE: POSITIVE
C GLABRATA DNA VAG QL NAA+PROBE: NEGATIVE
MEGA1 DNA VAG QL NAA+PROBE: ABNORMAL SCORE
SPECIMEN SOURCE: ABNORMAL
T VAGINALIS RRNA SPEC QL NAA+PROBE: NEGATIVE

## 2024-03-18 ENCOUNTER — TELEPHONE (OUTPATIENT)
Dept: OBGYN CLINIC | Age: 29
End: 2024-03-18

## 2024-03-18 RX ORDER — METRONIDAZOLE 500 MG/1
500 TABLET ORAL 2 TIMES DAILY
Qty: 14 TABLET | Refills: 0 | Status: SHIPPED | OUTPATIENT
Start: 2024-03-18 | End: 2024-03-25

## 2024-03-18 RX ORDER — FLUCONAZOLE 150 MG/1
150 TABLET ORAL ONCE
Qty: 1 TABLET | Refills: 0 | Status: SHIPPED | OUTPATIENT
Start: 2024-03-18 | End: 2024-03-18

## 2024-03-18 NOTE — TELEPHONE ENCOUNTER
Called patient, no answer.  LM informing patient will send my chart message with results and recommendations.  Reply or call back with additional questions.

## 2024-03-18 NOTE — TELEPHONE ENCOUNTER
----- Message from EARL Bassett - CNP sent at 3/18/2024  8:29 AM EDT -----  Swab + BV and yeast  Send rx flagyl 500mg 1 tab po bid x7d #14  Then diflucan 150mg po x1

## 2024-03-20 ENCOUNTER — TELEPHONE (OUTPATIENT)
Dept: OBGYN CLINIC | Age: 29
End: 2024-03-20

## 2024-03-20 NOTE — TELEPHONE ENCOUNTER
----- Message from Ministerio Toscano LPN sent at 3/20/2024  2:43 PM EDT -----  For Your Review    Patient contacted office requesting Medication Refills.     Nurse Contacted patient at [phone]. Nurse spoke with patient. Patient had questions regarding Medication. Fluconazole. Patient was prescribed medication on 03/18/2024 for BV and Yeast Infection.

## 2024-03-20 NOTE — TELEPHONE ENCOUNTER
Patient contacted office requesting Medication Refills.    Nurse Contacted patient at [phone]. Nurse spoke with patient. Patient had questions regarding Medication. Fluconazole.

## 2024-04-03 ENCOUNTER — ROUTINE PRENATAL (OUTPATIENT)
Dept: OBGYN CLINIC | Age: 29
End: 2024-04-03
Payer: MEDICAID

## 2024-04-03 ENCOUNTER — PROCEDURE VISIT (OUTPATIENT)
Dept: OBGYN CLINIC | Age: 29
End: 2024-04-03
Payer: MEDICAID

## 2024-04-03 VITALS — WEIGHT: 174.2 LBS | SYSTOLIC BLOOD PRESSURE: 114 MMHG | DIASTOLIC BLOOD PRESSURE: 60 MMHG | BODY MASS INDEX: 27.28 KG/M2

## 2024-04-03 DIAGNOSIS — Z34.82 PRENATAL CARE, SUBSEQUENT PREGNANCY, SECOND TRIMESTER: Primary | ICD-10-CM

## 2024-04-03 DIAGNOSIS — Z36.89 SCREENING, ANTENATAL, FOR FETAL ANATOMIC SURVEY: Primary | ICD-10-CM

## 2024-04-03 DIAGNOSIS — Z3A.20 20 WEEKS GESTATION OF PREGNANCY: ICD-10-CM

## 2024-04-03 DIAGNOSIS — Z13.89 SCREENING FOR GENITOURINARY CONDITION: ICD-10-CM

## 2024-04-03 LAB
GLUCOSE URINE, POC: NEGATIVE
PROTEIN,URINE, POC: NEGATIVE

## 2024-04-03 PROCEDURE — 81002 URINALYSIS NONAUTO W/O SCOPE: CPT | Performed by: OBSTETRICS & GYNECOLOGY

## 2024-04-03 PROCEDURE — 99213 OFFICE O/P EST LOW 20 MIN: CPT | Performed by: OBSTETRICS & GYNECOLOGY

## 2024-04-03 PROCEDURE — 76805 OB US >/= 14 WKS SNGL FETUS: CPT | Performed by: OBSTETRICS & GYNECOLOGY

## 2024-04-03 NOTE — PROGRESS NOTES
Patient Active Problem List    Diagnosis Date Noted    Rh negative status during pregnancy 01/29/2024     ABS-    Rhogam-      Pregnancy 01/25/2024     NIPT:low risk  GTT:  Flu:  Tdap:      Genital herpes      Acyclovir daily.       Appropriate growth.  Anatomy appears to be grossly WNL.  Sub optimal heart  Normal anterior placenta w 3V cord.    See full report with US      Discussed concerns of anatomy US     Education on need for follow up US provided

## 2024-04-11 ENCOUNTER — TELEPHONE (OUTPATIENT)
Dept: OBGYN CLINIC | Age: 29
End: 2024-04-11

## 2024-04-11 NOTE — PROGRESS NOTES
yeast, will plan diflucan x1.  STOP using Summers ashli wash with water only.   Myolog cream bid prn externally only.     Orders Placed This Encounter   Procedures    AMB POC OB URINE DIP     Patient reassured.    Supervising physician is Dr. Alba.  Greater than 50% of this 30 minute visit is spent in counseling to the above topics.

## 2024-04-11 NOTE — TELEPHONE ENCOUNTER
Called patient.  She states her sx went away and now having them again.    Per Brittani patient will need another appointment for evaluation.  Patient scheduled for Monday, call if needed before then.

## 2024-04-11 NOTE — TELEPHONE ENCOUNTER
Pt LVM reporting she completed medication (Flagyl, Diflucan) and is still experiencing vaginal itching and irritation.

## 2024-04-15 ENCOUNTER — ROUTINE PRENATAL (OUTPATIENT)
Dept: OBGYN CLINIC | Age: 29
End: 2024-04-15

## 2024-04-15 VITALS — BODY MASS INDEX: 27.8 KG/M2 | SYSTOLIC BLOOD PRESSURE: 122 MMHG | WEIGHT: 177.5 LBS | DIASTOLIC BLOOD PRESSURE: 70 MMHG

## 2024-04-15 DIAGNOSIS — Z3A.22 22 WEEKS GESTATION OF PREGNANCY: ICD-10-CM

## 2024-04-15 DIAGNOSIS — Z13.89 SCREENING FOR GENITOURINARY CONDITION: ICD-10-CM

## 2024-04-15 DIAGNOSIS — Z34.82 PRENATAL CARE, SUBSEQUENT PREGNANCY, SECOND TRIMESTER: ICD-10-CM

## 2024-04-15 DIAGNOSIS — N89.8 VAGINAL ITCHING: Primary | ICD-10-CM

## 2024-04-15 LAB
GLUCOSE URINE, POC: NEGATIVE
PROTEIN,URINE, POC: NEGATIVE

## 2024-04-15 RX ORDER — NYSTATIN AND TRIAMCINOLONE ACETONIDE 100000; 1 [USP'U]/G; MG/G
OINTMENT TOPICAL
Qty: 30 G | Refills: 0 | Status: SHIPPED | OUTPATIENT
Start: 2024-04-15

## 2024-04-18 RX ORDER — FLUCONAZOLE 150 MG/1
150 TABLET ORAL ONCE
Qty: 1 TABLET | Refills: 0 | Status: SHIPPED | OUTPATIENT
Start: 2024-04-18 | End: 2024-04-18

## 2024-04-18 NOTE — TELEPHONE ENCOUNTER
----- Message from EARL Bassett - CNP sent at 4/18/2024  2:12 PM EDT -----  + yeast   Send rx diflucan 150mg po x1

## 2024-05-01 ENCOUNTER — TELEPHONE (OUTPATIENT)
Dept: OBGYN CLINIC | Age: 29
End: 2024-05-01
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
## 2024-05-02 ENCOUNTER — PROCEDURE VISIT (OUTPATIENT)
Dept: OBGYN CLINIC | Age: 29
End: 2024-05-02
Payer: MEDICAID

## 2024-05-02 ENCOUNTER — ROUTINE PRENATAL (OUTPATIENT)
Dept: OBGYN CLINIC | Age: 29
End: 2024-05-02
Payer: MEDICAID

## 2024-05-02 VITALS — DIASTOLIC BLOOD PRESSURE: 62 MMHG | BODY MASS INDEX: 28.22 KG/M2 | WEIGHT: 180.2 LBS | SYSTOLIC BLOOD PRESSURE: 110 MMHG

## 2024-05-02 DIAGNOSIS — Z34.82 PRENATAL CARE, SUBSEQUENT PREGNANCY, SECOND TRIMESTER: Primary | ICD-10-CM

## 2024-05-02 DIAGNOSIS — Z36.89 SCREENING, ANTENATAL, FOR FETAL ANATOMIC SURVEY: Primary | ICD-10-CM

## 2024-05-02 DIAGNOSIS — Z3A.24 24 WEEKS GESTATION OF PREGNANCY: ICD-10-CM

## 2024-05-02 DIAGNOSIS — Z13.89 SCREENING FOR GENITOURINARY CONDITION: ICD-10-CM

## 2024-05-02 LAB
GLUCOSE URINE, POC: NEGATIVE
PROTEIN,URINE, POC: NEGATIVE

## 2024-05-02 PROCEDURE — 76816 OB US FOLLOW-UP PER FETUS: CPT | Performed by: OBSTETRICS & GYNECOLOGY

## 2024-05-02 PROCEDURE — 99213 OFFICE O/P EST LOW 20 MIN: CPT | Performed by: OBSTETRICS & GYNECOLOGY

## 2024-05-02 PROCEDURE — 81002 URINALYSIS NONAUTO W/O SCOPE: CPT | Performed by: OBSTETRICS & GYNECOLOGY
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
## 2024-05-20 RX ORDER — ACYCLOVIR 400 MG/1
TABLET ORAL
Qty: 30 TABLET | Refills: 1 | Status: SHIPPED | OUTPATIENT
Start: 2024-05-20

## 2024-05-30 ENCOUNTER — ROUTINE PRENATAL (OUTPATIENT)
Dept: OBGYN CLINIC | Age: 29
End: 2024-05-30
Payer: MEDICAID

## 2024-05-30 ENCOUNTER — NURSE ONLY (OUTPATIENT)
Dept: OBGYN CLINIC | Age: 29
End: 2024-05-30

## 2024-05-30 VITALS — WEIGHT: 181.2 LBS | SYSTOLIC BLOOD PRESSURE: 110 MMHG | BODY MASS INDEX: 28.38 KG/M2 | DIASTOLIC BLOOD PRESSURE: 60 MMHG

## 2024-05-30 DIAGNOSIS — Z13.0 SCREENING FOR IRON DEFICIENCY ANEMIA: ICD-10-CM

## 2024-05-30 DIAGNOSIS — Z13.89 SCREENING FOR GENITOURINARY CONDITION: ICD-10-CM

## 2024-05-30 DIAGNOSIS — Z3A.28 28 WEEKS GESTATION OF PREGNANCY: ICD-10-CM

## 2024-05-30 DIAGNOSIS — Z34.83 PRENATAL CARE, SUBSEQUENT PREGNANCY, THIRD TRIMESTER: Primary | ICD-10-CM

## 2024-05-30 DIAGNOSIS — Z67.91 RH NEGATIVE STATUS DURING PREGNANCY IN THIRD TRIMESTER: ICD-10-CM

## 2024-05-30 DIAGNOSIS — Z34.83 PRENATAL CARE, SUBSEQUENT PREGNANCY, THIRD TRIMESTER: ICD-10-CM

## 2024-05-30 DIAGNOSIS — O26.893 RH NEGATIVE STATUS DURING PREGNANCY IN THIRD TRIMESTER: ICD-10-CM

## 2024-05-30 DIAGNOSIS — Z13.1 SCREENING FOR DIABETES MELLITUS: ICD-10-CM

## 2024-05-30 LAB
BASOPHILS # BLD: 0 K/UL (ref 0–0.2)
BASOPHILS NFR BLD: 0 % (ref 0–2)
BLOOD GROUP ANTIBODIES SERPL: NORMAL
DIFFERENTIAL METHOD BLD: ABNORMAL
EOSINOPHIL # BLD: 0.1 K/UL (ref 0–0.8)
EOSINOPHIL NFR BLD: 2 % (ref 0.5–7.8)
ERYTHROCYTE [DISTWIDTH] IN BLOOD BY AUTOMATED COUNT: 13.2 % (ref 11.9–14.6)
GLUCOSE 1 HOUR: 133 MG/DL
GLUCOSE URINE, POC: NEGATIVE
HCT VFR BLD AUTO: 29.8 % (ref 35.8–46.3)
HGB BLD-MCNC: 9.3 G/DL (ref 11.7–15.4)
IMM GRANULOCYTES # BLD AUTO: 0 K/UL (ref 0–0.5)
IMM GRANULOCYTES NFR BLD AUTO: 1 % (ref 0–5)
LYMPHOCYTES # BLD: 1.3 K/UL (ref 0.5–4.6)
LYMPHOCYTES NFR BLD: 19 % (ref 13–44)
MCH RBC QN AUTO: 26.1 PG (ref 26.1–32.9)
MCHC RBC AUTO-ENTMCNC: 31.2 G/DL (ref 31.4–35)
MCV RBC AUTO: 83.7 FL (ref 82–102)
MONOCYTES # BLD: 0.3 K/UL (ref 0.1–1.3)
MONOCYTES NFR BLD: 5 % (ref 4–12)
NEUTS SEG # BLD: 5 K/UL (ref 1.7–8.2)
NEUTS SEG NFR BLD: 73 % (ref 43–78)
NRBC # BLD: 0 K/UL (ref 0–0.2)
PLATELET # BLD AUTO: 322 K/UL (ref 150–450)
PMV BLD AUTO: 10.2 FL (ref 9.4–12.3)
PROTEIN,URINE, POC: NEGATIVE
RBC # BLD AUTO: 3.56 M/UL (ref 4.05–5.2)
T PALLIDUM AB SER QL IA: NONREACTIVE
WBC # BLD AUTO: 6.8 K/UL (ref 4.3–11.1)

## 2024-05-30 PROCEDURE — 99213 OFFICE O/P EST LOW 20 MIN: CPT | Performed by: OBSTETRICS & GYNECOLOGY

## 2024-05-30 PROCEDURE — 81002 URINALYSIS NONAUTO W/O SCOPE: CPT | Performed by: OBSTETRICS & GYNECOLOGY
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
## 2024-05-31 ENCOUNTER — TELEPHONE (OUTPATIENT)
Dept: OBGYN CLINIC | Age: 29
End: 2024-05-31

## 2024-05-31 DIAGNOSIS — Z13.1 SCREENING FOR DIABETES MELLITUS: ICD-10-CM

## 2024-05-31 DIAGNOSIS — Z34.83 PRENATAL CARE, SUBSEQUENT PREGNANCY, THIRD TRIMESTER: Primary | ICD-10-CM

## 2024-05-31 DIAGNOSIS — D64.9 ANEMIA, UNSPECIFIED TYPE: ICD-10-CM

## 2024-05-31 DIAGNOSIS — Z3A.29 29 WEEKS GESTATION OF PREGNANCY: ICD-10-CM

## 2024-05-31 NOTE — TELEPHONE ENCOUNTER
----- Message from Alex Rubio MD sent at 5/31/2024  8:34 AM EDT -----  Labs are abnormal need daily iron and recheck hemoglobin in approximately a month.  Also needs 3-hour GTT

## 2024-05-31 NOTE — TELEPHONE ENCOUNTER
Orders Placed This Encounter    Gestational GTT, 3 HR     Standing Status:   Future     Standing Expiration Date:   5/31/2025

## 2024-06-03 RX ORDER — ACYCLOVIR 400 MG/1
TABLET ORAL
Qty: 180 TABLET | Refills: 1 | OUTPATIENT
Start: 2024-06-03

## 2024-06-04 ENCOUNTER — NURSE ONLY (OUTPATIENT)
Dept: OBGYN CLINIC | Age: 29
End: 2024-06-04

## 2024-06-04 DIAGNOSIS — Z13.1 SCREENING FOR DIABETES MELLITUS: ICD-10-CM

## 2024-06-04 DIAGNOSIS — Z34.83 PRENATAL CARE, SUBSEQUENT PREGNANCY, THIRD TRIMESTER: ICD-10-CM

## 2024-06-04 DIAGNOSIS — Z3A.29 29 WEEKS GESTATION OF PREGNANCY: ICD-10-CM

## 2024-06-05 LAB
GESTATIONAL 3HR GTT: NORMAL
GLUCOSE 1H P 100 G GLC PO SERPL-MCNC: 124 MG/DL (ref 0–180)
GLUCOSE 2 HOUR: 129 MG/DL (ref 0–155)
GLUCOSE P FAST SERPL-MCNC: 74 MG/DL (ref 0–95)
GLUCOSE, 3 HOUR: 117 MG/DL (ref 0–140)

## 2024-07-02 ENCOUNTER — ROUTINE PRENATAL (OUTPATIENT)
Dept: OBGYN CLINIC | Age: 29
End: 2024-07-02
Payer: MEDICAID

## 2024-07-02 VITALS — WEIGHT: 183.6 LBS | SYSTOLIC BLOOD PRESSURE: 104 MMHG | DIASTOLIC BLOOD PRESSURE: 62 MMHG | BODY MASS INDEX: 28.76 KG/M2

## 2024-07-02 DIAGNOSIS — Z67.91 RH NEGATIVE STATUS DURING PREGNANCY IN SECOND TRIMESTER: ICD-10-CM

## 2024-07-02 DIAGNOSIS — Z3A.33 33 WEEKS GESTATION OF PREGNANCY: Primary | ICD-10-CM

## 2024-07-02 DIAGNOSIS — O26.892 RH NEGATIVE STATUS DURING PREGNANCY IN SECOND TRIMESTER: ICD-10-CM

## 2024-07-02 LAB
GLUCOSE URINE, POC: NEGATIVE
PROTEIN,URINE, POC: NEGATIVE

## 2024-07-02 PROCEDURE — 81002 URINALYSIS NONAUTO W/O SCOPE: CPT | Performed by: NURSE PRACTITIONER

## 2024-07-02 PROCEDURE — 99213 OFFICE O/P EST LOW 20 MIN: CPT | Performed by: NURSE PRACTITIONER

## 2024-07-16 ENCOUNTER — ROUTINE PRENATAL (OUTPATIENT)
Dept: OBGYN CLINIC | Age: 29
End: 2024-07-16
Payer: MEDICAID

## 2024-07-16 VITALS — DIASTOLIC BLOOD PRESSURE: 64 MMHG | WEIGHT: 189.3 LBS | BODY MASS INDEX: 29.65 KG/M2 | SYSTOLIC BLOOD PRESSURE: 118 MMHG

## 2024-07-16 DIAGNOSIS — Z13.89 SCREENING FOR GENITOURINARY CONDITION: ICD-10-CM

## 2024-07-16 DIAGNOSIS — D64.9 ANEMIA, UNSPECIFIED TYPE: Primary | ICD-10-CM

## 2024-07-16 DIAGNOSIS — Z3A.35 35 WEEKS GESTATION OF PREGNANCY: ICD-10-CM

## 2024-07-16 LAB
GLUCOSE URINE, POC: NEGATIVE
PROTEIN,URINE, POC: NEGATIVE

## 2024-07-16 PROCEDURE — 81002 URINALYSIS NONAUTO W/O SCOPE: CPT | Performed by: OBSTETRICS & GYNECOLOGY

## 2024-07-16 PROCEDURE — 99214 OFFICE O/P EST MOD 30 MIN: CPT | Performed by: OBSTETRICS & GYNECOLOGY

## 2024-07-16 RX ORDER — ACYCLOVIR 400 MG/1
TABLET ORAL
Qty: 30 TABLET | Refills: 2 | Status: SHIPPED | OUTPATIENT
Start: 2024-07-16

## 2024-07-16 RX ORDER — DOCUSATE SODIUM 100 MG/1
100 CAPSULE, LIQUID FILLED ORAL 2 TIMES DAILY
COMMUNITY

## 2024-07-16 NOTE — PROGRESS NOTES
Aok, passed mucous plug. No real labor now  Start zovirax now for HSV outbreak preventions, refill sent  GBS next  Cont Fe for anemia, add more dietary Fe- discussed options  Labor precautions

## 2024-07-25 ENCOUNTER — ROUTINE PRENATAL (OUTPATIENT)
Dept: OBGYN CLINIC | Age: 29
End: 2024-07-25
Payer: MEDICAID

## 2024-07-25 VITALS — SYSTOLIC BLOOD PRESSURE: 118 MMHG | BODY MASS INDEX: 30.6 KG/M2 | DIASTOLIC BLOOD PRESSURE: 60 MMHG | WEIGHT: 195.4 LBS

## 2024-07-25 DIAGNOSIS — Z36.85 ANTENATAL SCREENING FOR STREPTOCOCCUS B: ICD-10-CM

## 2024-07-25 DIAGNOSIS — Z13.89 SCREENING FOR GENITOURINARY CONDITION: ICD-10-CM

## 2024-07-25 DIAGNOSIS — Z34.83 PRENATAL CARE, SUBSEQUENT PREGNANCY, THIRD TRIMESTER: Primary | ICD-10-CM

## 2024-07-25 DIAGNOSIS — Z3A.36 36 WEEKS GESTATION OF PREGNANCY: ICD-10-CM

## 2024-07-25 LAB
GLUCOSE URINE, POC: NEGATIVE
PROTEIN,URINE, POC: NORMAL

## 2024-07-25 PROCEDURE — 81002 URINALYSIS NONAUTO W/O SCOPE: CPT | Performed by: NURSE PRACTITIONER

## 2024-07-25 PROCEDURE — 99213 OFFICE O/P EST LOW 20 MIN: CPT | Performed by: NURSE PRACTITIONER

## 2024-07-25 NOTE — PROGRESS NOTES
Doing well. No complaints. GBS today. PTL precautions and CAREY reviewed. RTC 1 week.   
GBS today  
Other Specify
Doxepin Pregnancy And Lactation Text: This medication is Pregnancy Category C and it isn't known if it is safe during pregnancy. It is also excreted in breast milk and breast feeding isn't recommended.

## 2024-07-28 LAB
BACTERIA SPEC CULT: ABNORMAL
SERVICE CMNT-IMP: ABNORMAL

## 2024-07-29 PROBLEM — B95.1 POSITIVE GBS TEST: Status: ACTIVE | Noted: 2024-07-29

## 2024-08-02 ENCOUNTER — ROUTINE PRENATAL (OUTPATIENT)
Dept: OBGYN CLINIC | Age: 29
End: 2024-08-02
Payer: MEDICAID

## 2024-08-02 VITALS — BODY MASS INDEX: 30.73 KG/M2 | SYSTOLIC BLOOD PRESSURE: 124 MMHG | DIASTOLIC BLOOD PRESSURE: 70 MMHG | WEIGHT: 196.2 LBS

## 2024-08-02 DIAGNOSIS — Z34.83 PRENATAL CARE, SUBSEQUENT PREGNANCY, THIRD TRIMESTER: Primary | ICD-10-CM

## 2024-08-02 DIAGNOSIS — D64.9 ANEMIA, UNSPECIFIED TYPE: ICD-10-CM

## 2024-08-02 DIAGNOSIS — Z13.89 SCREENING FOR GENITOURINARY CONDITION: ICD-10-CM

## 2024-08-02 DIAGNOSIS — Z3A.37 37 WEEKS GESTATION OF PREGNANCY: ICD-10-CM

## 2024-08-02 LAB
ERYTHROCYTE [DISTWIDTH] IN BLOOD BY AUTOMATED COUNT: 17.9 % (ref 11.9–14.6)
GLUCOSE URINE, POC: NEGATIVE
HCT VFR BLD AUTO: 34.8 % (ref 35.8–46.3)
HGB BLD-MCNC: 10.9 G/DL (ref 11.7–15.4)
MCH RBC QN AUTO: 26.9 PG (ref 26.1–32.9)
MCHC RBC AUTO-ENTMCNC: 31.3 G/DL (ref 31.4–35)
MCV RBC AUTO: 85.9 FL (ref 82–102)
NRBC # BLD: 0 K/UL (ref 0–0.2)
PLATELET # BLD AUTO: 265 K/UL (ref 150–450)
PMV BLD AUTO: 10.5 FL (ref 9.4–12.3)
PROTEIN,URINE, POC: NEGATIVE
RBC # BLD AUTO: 4.05 M/UL (ref 4.05–5.2)
WBC # BLD AUTO: 7.3 K/UL (ref 4.3–11.1)

## 2024-08-02 PROCEDURE — 99213 OFFICE O/P EST LOW 20 MIN: CPT | Performed by: OBSTETRICS & GYNECOLOGY

## 2024-08-02 PROCEDURE — 81002 URINALYSIS NONAUTO W/O SCOPE: CPT | Performed by: OBSTETRICS & GYNECOLOGY

## 2024-08-08 ENCOUNTER — HOSPITAL ENCOUNTER (OUTPATIENT)
Age: 29
Discharge: HOME | End: 2024-08-08
Attending: OBSTETRICS & GYNECOLOGY | Admitting: OBSTETRICS & GYNECOLOGY
Payer: MEDICAID

## 2024-08-08 VITALS
SYSTOLIC BLOOD PRESSURE: 115 MMHG | OXYGEN SATURATION: 99 % | RESPIRATION RATE: 16 BRPM | HEART RATE: 83 BPM | DIASTOLIC BLOOD PRESSURE: 76 MMHG | TEMPERATURE: 98.4 F

## 2024-08-08 PROCEDURE — 99283 EMERGENCY DEPT VISIT LOW MDM: CPT

## 2024-08-08 NOTE — DISCHARGE INSTRUCTIONS
Pregnancy Precautions: Care Instructions  Overview     There is no sure way to prevent labor before your due date ( labor) or to prevent most other pregnancy problems. But there are things you can do to increase your chances of a healthy pregnancy. Go to your appointments, follow your doctor's advice, and take good care of yourself. Eat healthy foods, and exercise (if your doctor agrees). And make sure to drink plenty of water.  Follow-up care is a key part of your treatment and safety. Be sure to make and go to all appointments, and call your doctor if you are having problems. It's also a good idea to know your test results and keep a list of the medicines you take.  How can you care for yourself at home?  Make sure you go to your prenatal appointments. At each visit, your doctor will check your blood pressure and weight. Your doctor will also listen for a fetal heartbeat and measure the size of the uterus.  Drink plenty of fluids. Dehydration can cause contractions. If you have kidney, heart, or liver disease and have to limit fluids, talk with your doctor before you increase the amount of fluids you drink.  Tell your doctor right away if you notice any symptoms of an infection, such as:  Burning when you urinate.  A frequent need to urinate without being able to pass much urine.  A foul-smelling discharge from your vagina.  Vaginal itching.  Unexplained fever.  Unusual pain or soreness in your uterus or lower belly.  Avoid foods that may be harmful.  Don't eat raw meat, deli meat, raw seafood, or raw eggs.  Avoid soft cheese and unpasteurized dairy, like Brie and blue cheese.  Avoid fish that are high in mercury. These include shark, swordfish, say mackerel, marlin, orange roughy, and bigeye tuna, as well as tilefish from the [place].  If you smoke or vape, quit or cut back as much as you can. Talk to your doctor if you need help quitting.  If you use alcohol, marijuana, or other drugs, quit

## 2024-08-08 NOTE — ED TRIAGE NOTES
History & Physical    Name: Sarah Reilly MRN: [id number]  SSN: [id number]    YOB: 1995  Age: 28 y.o.  Sex: female      Subjective:     Reason for Triage visit:  38w5d and c/o regular uterine contractions earlier tonight.    History of Present Illness: Ms. Reilly is a 28 y.o.  female with an estimated gestational age of 38w5d with Estimated Date of Delivery: 24. Patient states that contractions were painful at home but have lessened in intensity.  Pregnancy has been  complicated by GBS carrier. Patient denies vaginal bleeding  and vaginal leaking of fluid .    OB History    Para Term  AB Living   3 1 1 0 1 1   SAB IAB Ectopic Molar Multiple Live Births     1       1      # Outcome Date GA Lbr Prieto/2nd Weight Sex Delivery Anes PTL Lv   3 Current            2 Term 18 40w5d 29:45 / 02:14 3.04 kg (6 lb 11.2 oz) F Vag-Spont EPI N COREY      Complications: 40 weeks gestation of pregnancy, Oligohydramnios, antepartum   1 IAB 12             Past Medical History:   Diagnosis Date    Anemia 2024    Asthma     outgrew in childhood    Genital herpes      Past Surgical History:   Procedure Laterality Date    ANTERIOR CRUCIATE LIGAMENT REPAIR      OTHER SURGICAL HISTORY      salivary gland excision    WISDOM TOOTH EXTRACTION       Social History     Occupational History    Not on file   Tobacco Use    Smoking status: Never    Smokeless tobacco: Never   Vaping Use    Vaping Use: Never used   Substance and Sexual Activity    Alcohol use: No    Drug use: No    Sexual activity: Yes     Partners: Male     Birth control/protection: Condom      Family History   Problem Relation Age of Onset    No Known Problems Paternal Grandfather     No Known Problems Paternal Grandmother     Kidney Disease Maternal Grandmother     Cancer Maternal Grandmother         Skin Ca    No Known Problems Father     Heart Disease Maternal Grandfather     Hypertension Mother     Cancer

## 2024-08-08 NOTE — PROGRESS NOTES
Pt to L&D with reports of backache. This began at 2000 on 8/7. Pt denies vaginal bleeding, loss of fluid, ctx, H/A, and blurry vision. Pt reports good fetal movement. EFM and TOCO applied to a soft, non-tender abd. MD notified.

## 2024-08-09 ENCOUNTER — ROUTINE PRENATAL (OUTPATIENT)
Dept: OBGYN CLINIC | Age: 29
End: 2024-08-09
Payer: MEDICAID

## 2024-08-09 VITALS — BODY MASS INDEX: 30.62 KG/M2 | SYSTOLIC BLOOD PRESSURE: 100 MMHG | WEIGHT: 195.5 LBS | DIASTOLIC BLOOD PRESSURE: 64 MMHG

## 2024-08-09 DIAGNOSIS — Z3A.38 38 WEEKS GESTATION OF PREGNANCY: ICD-10-CM

## 2024-08-09 DIAGNOSIS — Z34.83 PRENATAL CARE, SUBSEQUENT PREGNANCY, THIRD TRIMESTER: Primary | ICD-10-CM

## 2024-08-09 DIAGNOSIS — Z13.89 SCREENING FOR GENITOURINARY CONDITION: ICD-10-CM

## 2024-08-09 LAB
GLUCOSE URINE, POC: NEGATIVE
PROTEIN,URINE, POC: NEGATIVE

## 2024-08-09 PROCEDURE — 81002 URINALYSIS NONAUTO W/O SCOPE: CPT | Performed by: OBSTETRICS & GYNECOLOGY

## 2024-08-09 PROCEDURE — 99213 OFFICE O/P EST LOW 20 MIN: CPT | Performed by: OBSTETRICS & GYNECOLOGY

## 2024-08-09 NOTE — PROGRESS NOTES
AOK.  Reassured regarding concerns  IOL discussed.  She desires to wait due to unfavorable cervix.

## 2024-08-13 RX ORDER — ACYCLOVIR 400 MG/1
TABLET ORAL
Qty: 30 TABLET | Refills: 2 | OUTPATIENT
Start: 2024-08-13

## 2024-08-15 ENCOUNTER — ROUTINE PRENATAL (OUTPATIENT)
Dept: OBGYN CLINIC | Age: 29
End: 2024-08-15

## 2024-08-15 VITALS — BODY MASS INDEX: 30.81 KG/M2 | WEIGHT: 196.7 LBS | SYSTOLIC BLOOD PRESSURE: 122 MMHG | DIASTOLIC BLOOD PRESSURE: 60 MMHG

## 2024-08-15 DIAGNOSIS — Z3A.39 39 WEEKS GESTATION OF PREGNANCY: ICD-10-CM

## 2024-08-15 DIAGNOSIS — Z13.89 SCREENING FOR GENITOURINARY CONDITION: ICD-10-CM

## 2024-08-15 DIAGNOSIS — Z34.83 PRENATAL CARE, SUBSEQUENT PREGNANCY, THIRD TRIMESTER: Primary | ICD-10-CM

## 2024-08-15 DIAGNOSIS — B95.1 POSITIVE GBS TEST: ICD-10-CM

## 2024-08-15 LAB
GLUCOSE URINE, POC: NEGATIVE
PROTEIN,URINE, POC: NEGATIVE
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
## 2024-08-18 ENCOUNTER — HOSPITAL ENCOUNTER (INPATIENT)
Age: 29
LOS: 1 days | Discharge: HOME | DRG: 560 | End: 2024-08-19
Attending: OBSTETRICS & GYNECOLOGY | Admitting: OBSTETRICS & GYNECOLOGY
Payer: MEDICAID

## 2024-08-18 ENCOUNTER — ANESTHESIA EVENT (OUTPATIENT)
Dept: LABOR AND DELIVERY | Age: 29
DRG: 560 | End: 2024-08-18
Payer: MEDICAID

## 2024-08-18 ENCOUNTER — ANESTHESIA (OUTPATIENT)
Dept: LABOR AND DELIVERY | Age: 29
DRG: 560 | End: 2024-08-18
Payer: MEDICAID

## 2024-08-18 PROBLEM — Z37.9 NORMAL LABOR: Status: ACTIVE | Noted: 2024-08-18

## 2024-08-18 LAB
ABO + RH BLD: NORMAL
AMNISURE, POC: POSITIVE
BASE DEFICIT BLD-SCNC: 4.7 MMOL/L
BASE DEFICIT BLD-SCNC: 7.1 MMOL/L
BLOOD GROUP ANTIBODIES SERPL: NORMAL
ERYTHROCYTE [DISTWIDTH] IN BLOOD BY AUTOMATED COUNT: 17 % (ref 11.9–14.6)
HCO3 BLD-SCNC: 22.4 MMOL/L (ref 22–26)
HCO3 BLDV-SCNC: 20.9 MMOL/L (ref 23–28)
HCT VFR BLD AUTO: 33 % (ref 35.8–46.3)
HGB BLD-MCNC: 10.6 G/DL (ref 11.7–15.4)
Lab: NORMAL
MCH RBC QN AUTO: 26.6 PG (ref 26.1–32.9)
MCHC RBC AUTO-ENTMCNC: 32.1 G/DL (ref 31.4–35)
MCV RBC AUTO: 82.9 FL (ref 82–102)
NEGATIVE QC PASS/FAIL: NORMAL
NRBC # BLD: 0 K/UL (ref 0–0.2)
PCO2 BLDCO: 40 MMHG (ref 32–68)
PCO2 BLDCO: 60 MMHG (ref 32–68)
PH BLDCO: 7.18 (ref 7.15–7.38)
PH BLDCO: 7.33 (ref 7.15–7.38)
PLATELET # BLD AUTO: 285 K/UL (ref 150–450)
PMV BLD AUTO: 10 FL (ref 9.4–12.3)
PO2 BLDCO: 12 MMHG
PO2 BLDCO: 29 MMHG
POSITIVE QC PASS/FAIL: NORMAL
RBC # BLD AUTO: 3.98 M/UL (ref 4.05–5.2)
SAO2 % BLD: 8.4 % (ref 95–98)
SAO2 % BLDV: 50.4 % (ref 65–88)
SERVICE CMNT-IMP: ABNORMAL
SERVICE CMNT-IMP: ABNORMAL
SPECIMEN EXP DATE BLD: NORMAL
SPECIMEN TYPE: ABNORMAL
SPECIMEN TYPE: ABNORMAL
T PALLIDUM AB SER QL IA: NONREACTIVE
WBC # BLD AUTO: 8.9 K/UL (ref 4.3–11.1)

## 2024-08-18 PROCEDURE — 3700000025 EPIDURAL BLOCK: Performed by: ANESTHESIOLOGY

## 2024-08-18 PROCEDURE — 84112 EVAL AMNIOTIC FLUID PROTEIN: CPT

## 2024-08-18 PROCEDURE — 86900 BLOOD TYPING SEROLOGIC ABO: CPT

## 2024-08-18 PROCEDURE — 86901 BLOOD TYPING SEROLOGIC RH(D): CPT

## 2024-08-18 PROCEDURE — 6360000002 HC RX W HCPCS: Performed by: OBSTETRICS & GYNECOLOGY

## 2024-08-18 PROCEDURE — 00HU33Z INSERTION OF INFUSION DEVICE INTO SPINAL CANAL, PERCUTANEOUS APPROACH: ICD-10-PCS | Performed by: ANESTHESIOLOGY

## 2024-08-18 PROCEDURE — 86780 TREPONEMA PALLIDUM: CPT

## 2024-08-18 PROCEDURE — 2580000003 HC RX 258: Performed by: OBSTETRICS & GYNECOLOGY

## 2024-08-18 PROCEDURE — 51701 INSERT BLADDER CATHETER: CPT

## 2024-08-18 PROCEDURE — 82803 BLOOD GASES ANY COMBINATION: CPT

## 2024-08-18 PROCEDURE — 85027 COMPLETE CBC AUTOMATED: CPT

## 2024-08-18 PROCEDURE — 2500000003 HC RX 250 WO HCPCS: Performed by: ANESTHESIOLOGY

## 2024-08-18 PROCEDURE — 6370000000 HC RX 637 (ALT 250 FOR IP): Performed by: OBSTETRICS & GYNECOLOGY

## 2024-08-18 PROCEDURE — 7100000011 HC PHASE II RECOVERY - ADDTL 15 MIN

## 2024-08-18 PROCEDURE — 36600 WITHDRAWAL OF ARTERIAL BLOOD: CPT

## 2024-08-18 PROCEDURE — 7100000010 HC PHASE II RECOVERY - FIRST 15 MIN

## 2024-08-18 PROCEDURE — 59025 FETAL NON-STRESS TEST: CPT

## 2024-08-18 PROCEDURE — 7210000100 HC LABOR FEE PER 1 HR

## 2024-08-18 PROCEDURE — 1100000000 HC RM PRIVATE

## 2024-08-18 PROCEDURE — 59409 OBSTETRICAL CARE: CPT | Performed by: OBSTETRICS & GYNECOLOGY

## 2024-08-18 PROCEDURE — 99285 EMERGENCY DEPT VISIT HI MDM: CPT

## 2024-08-18 PROCEDURE — 2500000003 HC RX 250 WO HCPCS: Performed by: NURSE ANESTHETIST, CERTIFIED REGISTERED

## 2024-08-18 PROCEDURE — 7220000101 HC DELIVERY VAGINAL/SINGLE

## 2024-08-18 PROCEDURE — 86850 RBC ANTIBODY SCREEN: CPT

## 2024-08-18 RX ORDER — ACYCLOVIR 400 MG/1
400 TABLET ORAL 2 TIMES DAILY PRN
Status: DISCONTINUED | OUTPATIENT
Start: 2024-08-18 | End: 2024-08-18

## 2024-08-18 RX ORDER — ACETAMINOPHEN 500 MG
1000 TABLET ORAL EVERY 8 HOURS SCHEDULED
Status: DISCONTINUED | OUTPATIENT
Start: 2024-08-18 | End: 2024-08-19 | Stop reason: HOSPADM

## 2024-08-18 RX ORDER — ONDANSETRON 4 MG/1
4 TABLET, ORALLY DISINTEGRATING ORAL EVERY 6 HOURS PRN
Status: DISCONTINUED | OUTPATIENT
Start: 2024-08-18 | End: 2024-08-19 | Stop reason: HOSPADM

## 2024-08-18 RX ORDER — TERBUTALINE SULFATE 1 MG/ML
0.25 INJECTION, SOLUTION SUBCUTANEOUS
Status: DISCONTINUED | OUTPATIENT
Start: 2024-08-18 | End: 2024-08-18

## 2024-08-18 RX ORDER — ACYCLOVIR 800 MG/1
400 TABLET ORAL 2 TIMES DAILY
Status: DISCONTINUED | OUTPATIENT
Start: 2024-08-18 | End: 2024-08-19 | Stop reason: HOSPADM

## 2024-08-18 RX ORDER — TRANEXAMIC ACID 10 MG/ML
1000 INJECTION, SOLUTION INTRAVENOUS
Status: DISCONTINUED | OUTPATIENT
Start: 2024-08-18 | End: 2024-08-18

## 2024-08-18 RX ORDER — SODIUM CHLORIDE 9 MG/ML
INJECTION, SOLUTION INTRAVENOUS PRN
Status: DISCONTINUED | OUTPATIENT
Start: 2024-08-18 | End: 2024-08-19

## 2024-08-18 RX ORDER — SODIUM CHLORIDE 0.9 % (FLUSH) 0.9 %
5-40 SYRINGE (ML) INJECTION PRN
Status: DISCONTINUED | OUTPATIENT
Start: 2024-08-18 | End: 2024-08-18

## 2024-08-18 RX ORDER — MISOPROSTOL 200 UG/1
400 TABLET ORAL PRN
Status: DISCONTINUED | OUTPATIENT
Start: 2024-08-18 | End: 2024-08-18

## 2024-08-18 RX ORDER — LANOLIN
CREAM (ML) TOPICAL PRN
Status: DISCONTINUED | OUTPATIENT
Start: 2024-08-18 | End: 2024-08-19 | Stop reason: HOSPADM

## 2024-08-18 RX ORDER — OXYCODONE HYDROCHLORIDE 5 MG/1
5 TABLET ORAL EVERY 4 HOURS PRN
Status: DISCONTINUED | OUTPATIENT
Start: 2024-08-18 | End: 2024-08-19 | Stop reason: HOSPADM

## 2024-08-18 RX ORDER — MISOPROSTOL 200 UG/1
200 TABLET ORAL EVERY 6 HOURS PRN
Status: DISCONTINUED | OUTPATIENT
Start: 2024-08-18 | End: 2024-08-19 | Stop reason: HOSPADM

## 2024-08-18 RX ORDER — SODIUM CHLORIDE 0.9 % (FLUSH) 0.9 %
5-40 SYRINGE (ML) INJECTION EVERY 12 HOURS SCHEDULED
Status: DISCONTINUED | OUTPATIENT
Start: 2024-08-18 | End: 2024-08-18

## 2024-08-18 RX ORDER — SODIUM CHLORIDE, SODIUM LACTATE, POTASSIUM CHLORIDE, CALCIUM CHLORIDE 600; 310; 30; 20 MG/100ML; MG/100ML; MG/100ML; MG/100ML
INJECTION, SOLUTION INTRAVENOUS CONTINUOUS
Status: DISCONTINUED | OUTPATIENT
Start: 2024-08-18 | End: 2024-08-19

## 2024-08-18 RX ORDER — SODIUM CHLORIDE 0.9 % (FLUSH) 0.9 %
5-40 SYRINGE (ML) INJECTION PRN
Status: DISCONTINUED | OUTPATIENT
Start: 2024-08-18 | End: 2024-08-19

## 2024-08-18 RX ORDER — SODIUM CHLORIDE, SODIUM LACTATE, POTASSIUM CHLORIDE, AND CALCIUM CHLORIDE .6; .31; .03; .02 G/100ML; G/100ML; G/100ML; G/100ML
1000 INJECTION, SOLUTION INTRAVENOUS PRN
Status: DISCONTINUED | OUTPATIENT
Start: 2024-08-18 | End: 2024-08-18

## 2024-08-18 RX ORDER — ACETAMINOPHEN 325 MG/1
650 TABLET ORAL EVERY 4 HOURS PRN
Status: DISCONTINUED | OUTPATIENT
Start: 2024-08-18 | End: 2024-08-18

## 2024-08-18 RX ORDER — SODIUM CHLORIDE, SODIUM LACTATE, POTASSIUM CHLORIDE, AND CALCIUM CHLORIDE .6; .31; .03; .02 G/100ML; G/100ML; G/100ML; G/100ML
500 INJECTION, SOLUTION INTRAVENOUS PRN
Status: DISCONTINUED | OUTPATIENT
Start: 2024-08-18 | End: 2024-08-18

## 2024-08-18 RX ORDER — NALOXONE HYDROCHLORIDE 0.4 MG/ML
INJECTION, SOLUTION INTRAMUSCULAR; INTRAVENOUS; SUBCUTANEOUS PRN
Status: DISCONTINUED | OUTPATIENT
Start: 2024-08-18 | End: 2024-08-18

## 2024-08-18 RX ORDER — DOCUSATE SODIUM 100 MG/1
100 CAPSULE, LIQUID FILLED ORAL 2 TIMES DAILY
Status: DISCONTINUED | OUTPATIENT
Start: 2024-08-18 | End: 2024-08-19 | Stop reason: HOSPADM

## 2024-08-18 RX ORDER — ONDANSETRON 2 MG/ML
4 INJECTION INTRAMUSCULAR; INTRAVENOUS EVERY 6 HOURS PRN
Status: DISCONTINUED | OUTPATIENT
Start: 2024-08-18 | End: 2024-08-19

## 2024-08-18 RX ORDER — SODIUM CHLORIDE, SODIUM LACTATE, POTASSIUM CHLORIDE, CALCIUM CHLORIDE 600; 310; 30; 20 MG/100ML; MG/100ML; MG/100ML; MG/100ML
INJECTION, SOLUTION INTRAVENOUS CONTINUOUS
Status: DISCONTINUED | OUTPATIENT
Start: 2024-08-18 | End: 2024-08-18

## 2024-08-18 RX ORDER — LIDOCAINE HYDROCHLORIDE AND EPINEPHRINE 15; 5 MG/ML; UG/ML
INJECTION, SOLUTION EPIDURAL PRN
Status: DISCONTINUED | OUTPATIENT
Start: 2024-08-18 | End: 2024-08-18 | Stop reason: SDUPTHER

## 2024-08-18 RX ORDER — TRANEXAMIC ACID 10 MG/ML
1000 INJECTION, SOLUTION INTRAVENOUS
Status: ACTIVE | OUTPATIENT
Start: 2024-08-18 | End: 2024-08-19

## 2024-08-18 RX ORDER — ONDANSETRON 4 MG/1
4 TABLET, ORALLY DISINTEGRATING ORAL EVERY 6 HOURS PRN
Status: DISCONTINUED | OUTPATIENT
Start: 2024-08-18 | End: 2024-08-18

## 2024-08-18 RX ORDER — EPHEDRINE SULFATE/0.9% NACL/PF 50 MG/5 ML
SYRINGE (ML) INTRAVENOUS PRN
Status: DISCONTINUED | OUTPATIENT
Start: 2024-08-18 | End: 2024-08-18 | Stop reason: SDUPTHER

## 2024-08-18 RX ORDER — SODIUM CHLORIDE 0.9 % (FLUSH) 0.9 %
5-40 SYRINGE (ML) INJECTION EVERY 12 HOURS SCHEDULED
Status: DISCONTINUED | OUTPATIENT
Start: 2024-08-18 | End: 2024-08-19

## 2024-08-18 RX ORDER — ROPIVACAINE HYDROCHLORIDE 2 MG/ML
INJECTION, SOLUTION EPIDURAL; INFILTRATION; PERINEURAL PRN
Status: DISCONTINUED | OUTPATIENT
Start: 2024-08-18 | End: 2024-08-18 | Stop reason: SDUPTHER

## 2024-08-18 RX ORDER — ONDANSETRON 2 MG/ML
4 INJECTION INTRAMUSCULAR; INTRAVENOUS EVERY 6 HOURS PRN
Status: DISCONTINUED | OUTPATIENT
Start: 2024-08-18 | End: 2024-08-18

## 2024-08-18 RX ORDER — SODIUM CHLORIDE 9 MG/ML
INJECTION, SOLUTION INTRAVENOUS PRN
Status: DISCONTINUED | OUTPATIENT
Start: 2024-08-18 | End: 2024-08-18

## 2024-08-18 RX ORDER — IBUPROFEN 800 MG/1
800 TABLET ORAL EVERY 8 HOURS SCHEDULED
Status: DISCONTINUED | OUTPATIENT
Start: 2024-08-18 | End: 2024-08-19 | Stop reason: HOSPADM

## 2024-08-18 RX ORDER — LIDOCAINE HYDROCHLORIDE 10 MG/ML
1 INJECTION, SOLUTION INFILTRATION; PERINEURAL
Status: DISCONTINUED | OUTPATIENT
Start: 2024-08-18 | End: 2024-08-18

## 2024-08-18 RX ADMIN — IBUPROFEN 800 MG: 800 TABLET, FILM COATED ORAL at 23:58

## 2024-08-18 RX ADMIN — OXYTOCIN 1 MILLI-UNITS/MIN: 10 INJECTION INTRAVENOUS at 09:50

## 2024-08-18 RX ADMIN — ROPIVACAINE HYDROCHLORIDE 10 ML/HR: 2 INJECTION, SOLUTION EPIDURAL; INFILTRATION; PERINEURAL at 06:47

## 2024-08-18 RX ADMIN — Medication 10 MG: at 08:07

## 2024-08-18 RX ADMIN — IBUPROFEN 800 MG: 800 TABLET, FILM COATED ORAL at 16:10

## 2024-08-18 RX ADMIN — SODIUM CHLORIDE 2.5 MILLION UNITS: 9 INJECTION, SOLUTION INTRAVENOUS at 08:15

## 2024-08-18 RX ADMIN — ACYCLOVIR 400 MG: 800 TABLET ORAL at 21:13

## 2024-08-18 RX ADMIN — SODIUM CHLORIDE 2.5 MILLION UNITS: 9 INJECTION, SOLUTION INTRAVENOUS at 12:04

## 2024-08-18 RX ADMIN — PENICILLIN G POTASSIUM 5 MILLION UNITS: 5000000 INJECTION, POWDER, FOR SOLUTION INTRAMUSCULAR; INTRAVENOUS at 03:50

## 2024-08-18 RX ADMIN — SODIUM CHLORIDE, POTASSIUM CHLORIDE, SODIUM LACTATE AND CALCIUM CHLORIDE: 600; 310; 30; 20 INJECTION, SOLUTION INTRAVENOUS at 03:40

## 2024-08-18 RX ADMIN — WITCH HAZEL: 500 SOLUTION RECTAL; TOPICAL at 16:10

## 2024-08-18 RX ADMIN — BENZOCAINE AND LEVOMENTHOL: 200; 5 SPRAY TOPICAL at 16:56

## 2024-08-18 RX ADMIN — ONDANSETRON 4 MG: 2 INJECTION INTRAMUSCULAR; INTRAVENOUS at 08:15

## 2024-08-18 RX ADMIN — LIDOCAINE HYDROCHLORIDE,EPINEPHRINE BITARTRATE 3 ML: 15; .005 INJECTION, SOLUTION EPIDURAL; INFILTRATION; INTRACAUDAL; PERINEURAL at 06:41

## 2024-08-18 RX ADMIN — ROPIVACAINE HYDROCHLORIDE 7 ML: 2 INJECTION, SOLUTION EPIDURAL; INFILTRATION; PERINEURAL at 06:45

## 2024-08-18 RX ADMIN — ACETAMINOPHEN 1000 MG: 500 TABLET, FILM COATED ORAL at 18:22

## 2024-08-18 RX ADMIN — Medication 10 MG: at 08:11

## 2024-08-18 RX ADMIN — DOCUSATE SODIUM 100 MG: 100 CAPSULE, LIQUID FILLED ORAL at 21:13

## 2024-08-18 RX ADMIN — BUTORPHANOL TARTRATE 1 MG: 2 INJECTION, SOLUTION INTRAMUSCULAR; INTRAVENOUS at 04:46

## 2024-08-18 RX ADMIN — SODIUM CHLORIDE, POTASSIUM CHLORIDE, SODIUM LACTATE AND CALCIUM CHLORIDE 1000 ML: 600; 310; 30; 20 INJECTION, SOLUTION INTRAVENOUS at 06:30

## 2024-08-18 ASSESSMENT — PAIN SCALES - GENERAL: PAINLEVEL_OUTOF10: 0

## 2024-08-18 NOTE — ANESTHESIA PRE PROCEDURE
Department of Anesthesiology  Preprocedure Note       Name:  Sarah Reilly   Age:  28 y.o.  :  1995                                          MRN:  [id number]         Date:  2024      Surgeon: * No surgeons listed *    Procedure: * No procedures listed *    Medications prior to admission:   Prior to Admission medications    Medication Sig Start Date End Date Taking? Authorizing Provider   Polyethylene Glycol 3350 (MIRALAX PO) Take by mouth   Yes Alvaro Fallon MD   acyclovir (ZOVIRAX) 400 MG tablet Take one tablet by oral route twice daily as needed for genital outbreak 24  Yes Franck Childress MD   Prenatal Multivit-Min-Fe-FA (PRENATAL 1 + IRON PO) Take by mouth   Yes Alvaro Fallon MD   docusate sodium (COLACE) 100 MG capsule Take 1 capsule by mouth 2 times daily  Patient not taking: Reported on 8/15/2024    Alvaro Fallon MD   Ferrous Sulfate (IRON SLOW RELEASE PO) Take by mouth  Patient not taking: Reported on 2024    Alvaro Fallon MD       Current medications:    Current Facility-Administered Medications   Medication Dose Route Frequency Provider Last Rate Last Admin   • terbutaline (BRETHINE) injection 0.25 mg  0.25 mg SubCUTAneous Once PRN Yari Barnhart MD       • lactated ringers IV soln infusion   IntraVENous Continuous Yari Barnhart  mL/hr at 24 0641 Rate Verify at 24 0641   • lactated ringers bolus 500 mL  500 mL IntraVENous PRN Yari Barnhart MD        Or   • lactated ringers bolus 1,000 mL  1,000 mL IntraVENous PRN Yari Barnhart .9 mL/hr at 24 0630 1,000 mL at 24 0630   • sodium chloride flush 0.9 % injection 5-40 mL  5-40 mL IntraVENous 2 times per day Yari Barnhart MD       • sodium chloride flush 0.9 % injection 5-40 mL  5-40 mL IntraVENous PRN Yari Barnhart MD       • 0.9 % sodium chloride infusion   IntraVENous PRN Yari Barnhart MD       • butorphanol (STADOL) injection 1 mg  1 mg IntraVENous Q3H PRN Obey

## 2024-08-18 NOTE — H&P
Obstetrics History & Physical/OB ED note    Name: Sarah Reilly MRN: [id number]     YOB: 1995  Age: 28 y.o.  Sex: female      Reason for Presentation:  possible spontaneous rupture of membranes    HPI: Sarah Reilly is a 28 y.o.  female with Estimated Date of Delivery: 24 at 40w1d gestation. Her obstetrical history is significant for one previous full-term vaginal delivery.  Prenatal records reviewed.     Complains of leakage of fluid starting at 1:30 a.m. Beginning to have contractions.    She reports good fetal movement. She denies vaginal bleeding.       Past History:  OB History    Para Term  AB Living   3 1 1 0 1 1   SAB IAB Ectopic Molar Multiple Live Births     1       1      # Outcome Date GA Lbr Prieto/2nd Weight Sex Type Anes PTL Lv   3 Current            2 Term 18 40w5d 29:45 / 02:14 3.04 kg (6 lb 11.2 oz) F Vag-Spont EPI N COREY      Complications: 40 weeks gestation of pregnancy, Oligohydramnios, antepartum   1 IAB 12             Past Medical History:   Diagnosis Date    Anemia 2024    Asthma     outgrew in childhood    Genital herpes      Past Surgical History:   Procedure Laterality Date    ANTERIOR CRUCIATE LIGAMENT REPAIR      OTHER SURGICAL HISTORY  2007    salivary gland excision    WISDOM TOOTH EXTRACTION       Social History     Tobacco Use    Smoking status: Never    Smokeless tobacco: Never   Substance Use Topics    Alcohol use: No     Prior to Admission medications    Medication Sig Start Date End Date Taking? Authorizing Provider   Polyethylene Glycol 3350 (MIRALAX PO) Take by mouth   Yes Alvaro Fallon MD   acyclovir (ZOVIRAX) 400 MG tablet Take one tablet by oral route twice daily as needed for genital outbreak 24  Yes Franck Childress MD   Prenatal Multivit-Min-Fe-FA (PRENATAL 1 + IRON PO) Take by mouth   Yes Alvaro Fallon MD   docusate sodium (COLACE) 100 MG capsule Take 1 capsule by mouth 2 times

## 2024-08-18 NOTE — PROGRESS NOTES
Patient presents to [facility] with complaints of LOF at 0130. States that fluid was clear. States having contractions every 10 min. Denies VB and DFM. US and toco placed on soft, non-tender abdomen at this time. Amnisure collected.

## 2024-08-18 NOTE — L&D DELIVERY NOTE
Jacques Reilly [[id number]]      Labor Events     Labor: No   Steroids: None  Cervical Ripening Date/Time:      Antibiotics Received during Labor: Yes  Rupture Identifier: Sac 1  Rupture Date/Time:  24 01:30:00   Rupture Type: SROM  Fluid Color: Meconium  Meconium Consistency: Thick  Fluid Odor: None  Fluid Volume: Moderate  Augmentation: Oxytocin  Labor Complications: None       Anesthesia    Method: Epidural       Labor Event Times      Labor onset date/time:  24 01:30:00     Dilation complete date/time:  24 12:15:00     Start pushing date/time:  2024 12:45:00   Decision date/time (emergent ):            Delivery Details      Delivery Date: 24 Delivery Time: 13:21:00   Delivery Type: Vaginal, Spontaneous              [place] Presentation    Presentation: Vertex  Position: Right  _: Occiput  _: Anterior       Shoulder Dystocia    Shoulder Dystocia Present?: No       Assisted Delivery Details    Forceps Attempted?: No  Vacuum Extractor Attempted?: No                           Cord    Vessels: 3 Vessels  Complications: None  Delayed Cord Clamping?: Yes  Cord Clamped Date/Time: 2024 13:22:00  Cord Blood Disposition: Lab  Gases Sent?: Yes              Placenta    Date/Time: 2024 13:25:00  Removal: Spontaneous  Appearance: Intact  Disposition: Discarded       Lacerations    Episiotomy: None  Perineal Lacerations: None  Other Lacerations: no non-perineal laceration       Vaginal Counts    Initial Count Personnel: STAR  Initial Count Verified By: ADINA  Intial Sponge Count: Correct Intial Needles Count: Correct Intial Instruments Count: Correct   Final Sponges Count: Correct Final Needles  Count: Correct Final Instruments Count: Correct   Final Count Personnel: STAR  Final Count Verified By: ADINA  Accurate Final Count?: Yes       Blood Loss  Mother: Melba Sarah R #[id number]     Start of Mother's Information      Delivery Blood Loss  24 0130 - 24

## 2024-08-18 NOTE — PROGRESS NOTES
1215-SVE 10/100/+1. Dr Alba here and ok with patient pushing.     1321-  of viable baby girl. Infant handed over to Gerard and RT for assessment due to meconium. Apgars 8&9.     1324- Placenta delivered. Pitocin infusing at 909ml/hr. Epidural turned off.     1335- Recovery started. Infant skin to skin with Mom.

## 2024-08-18 NOTE — PROGRESS NOTES
Straight cath for 150cc clear yellow urine. SVE 6/90/-2. Dr Alba updated and orders received to start pitocin to regulate contractions.

## 2024-08-18 NOTE — ANESTHESIA PROCEDURE NOTES
Epidural Block    Patient location during procedure: OB  Start time: 8/18/2024 6:37 AM  End time: 8/18/2024 6:40 AM  Reason for block: labor epidural  Staffing  Performed: anesthesiologist   Anesthesiologist: Betsy Jaimes MD  Performed by: Betsy Jaimes MD  Authorized by: Betsy Jaimes MD    Epidural  Patient position: sitting  Prep: ChloraPrep  Patient monitoring: continuous pulse ox and frequent blood pressure checks  Approach: midline  Location: L3-4  Injection technique: COLLETTE air and COLLETTE saline  Provider prep: mask and sterile gloves  Needle  Needle type: Tuohy   Needle gauge: 17 G  Epidural needle length (in): 10 cm.  Needle insertion depth: 5 cm  Catheter type: end hole  Catheter size: 19 G  Catheter at skin depth: 10 cm  Test dose: negativeCatheter Secured: tegaderm and tape (liquid adhesive)  Assessment  Hemodynamics: stable  Attempts: 1  Outcomes: patient tolerated procedure well and uncomplicated  Additional Notes  3 cc 1% lidocaine local at needle insertion site.    Risks/benefits/alternatives discussed including damage to muscle or nerve, bleeding, infection, and a reaction to our medications.    Preanesthetic Checklist  Completed: patient identified, IV checked, risks and benefits discussed, equipment checked, pre-op evaluation, timeout performed, anesthesia consent given, oxygen available and monitors applied/VS acknowledged

## 2024-08-19 VITALS
OXYGEN SATURATION: 98 % | RESPIRATION RATE: 16 BRPM | TEMPERATURE: 97.9 F | DIASTOLIC BLOOD PRESSURE: 71 MMHG | SYSTOLIC BLOOD PRESSURE: 115 MMHG | HEART RATE: 73 BPM

## 2024-08-19 LAB
BLOOD BANK CMNT PATIENT-IMP: NORMAL
FETAL SCREEN: NORMAL
HGB BLD-MCNC: 8.9 G/DL (ref 11.7–15.4)

## 2024-08-19 PROCEDURE — 6360000002 HC RX W HCPCS: Performed by: OBSTETRICS & GYNECOLOGY

## 2024-08-19 PROCEDURE — 6370000000 HC RX 637 (ALT 250 FOR IP): Performed by: OBSTETRICS & GYNECOLOGY

## 2024-08-19 PROCEDURE — 85461 HEMOGLOBIN FETAL: CPT

## 2024-08-19 PROCEDURE — 36415 COLL VENOUS BLD VENIPUNCTURE: CPT

## 2024-08-19 PROCEDURE — 85018 HEMOGLOBIN: CPT

## 2024-08-19 RX ORDER — IBUPROFEN 800 MG/1
800 TABLET ORAL EVERY 8 HOURS SCHEDULED
Qty: 30 TABLET | Refills: 0 | Status: SHIPPED | OUTPATIENT
Start: 2024-08-19

## 2024-08-19 RX ADMIN — ACETAMINOPHEN 1000 MG: 500 TABLET, FILM COATED ORAL at 12:14

## 2024-08-19 RX ADMIN — ACYCLOVIR 400 MG: 800 TABLET ORAL at 08:07

## 2024-08-19 RX ADMIN — ACETAMINOPHEN 1000 MG: 500 TABLET, FILM COATED ORAL at 04:13

## 2024-08-19 RX ADMIN — DOCUSATE SODIUM 100 MG: 100 CAPSULE, LIQUID FILLED ORAL at 08:07

## 2024-08-19 RX ADMIN — IBUPROFEN 800 MG: 800 TABLET, FILM COATED ORAL at 08:06

## 2024-08-19 RX ADMIN — IBUPROFEN 800 MG: 800 TABLET, FILM COATED ORAL at 15:06

## 2024-08-19 RX ADMIN — RHO(D) IMMUNE GLOBULIN (HUMAN) 300 MCG: 1500 SOLUTION INTRAMUSCULAR at 10:34

## 2024-08-19 NOTE — ANESTHESIA POSTPROCEDURE EVALUATION
Department of Anesthesiology  Postprocedure Note    Patient: Sarah Reilly  MRN: [id number]  YOB: 1995  Date of evaluation: 2024    Procedure Summary       Date: 24 Room / Location:     Anesthesia Start: 0630 Anesthesia Stop: 1321    Procedure: Labor Analgesia Diagnosis:     Scheduled Providers:  Responsible Provider: Chris Larson DO    Anesthesia Type: epidural ASA Status: 2            Anesthesia Type: No value filed.    Juan Manuel Phase I:      Juan Manuel Phase II:      Anesthesia Post Evaluation    Patient location during evaluation: floor  Level of consciousness: awake and alert  Airway patency: patent  Nausea & Vomiting: no nausea  Cardiovascular status: hemodynamically stable  Respiratory status: acceptable  Hydration status: euvolemic  Comments: S/p  utilizing ESTEFANY.  Up and ambulating to bathroom without difficulty.  No issues reported  Pain management: satisfactory to patient    No notable events documented.

## 2024-08-19 NOTE — LACTATION NOTE

## 2024-08-19 NOTE — DISCHARGE INSTRUCTIONS
You can also call the Maternal Mental Health Hotline at 6-757-COM-MAMA (1-369.232.8341) for support. If these mood changes last more than a couple of weeks, talk to your doctor or midwife.  When should you call for help?  Share this information with your partner, family, or a friend. They can help you watch for warning signs.  Call 911  anytime you think you may need emergency care. For example, call if:    You feel you cannot stop from hurting yourself, your baby, or someone else.     You passed out (lost consciousness).     You have chest pain, are short of breath, or cough up blood.     You have a seizure.   Where to get help 24 hours a day, 7 days a week   If you or someone you know talks about suicide, self-harm, a mental health crisis, a substance use crisis, or any other kind of emotional distress, get help right away. You can:    Call the Suicide and Crisis Lifeline at 988.     Call 8-943-420-TALK (1-614.972.7701).     Text HOME to 574317 to access the Crisis Text Line.   Consider saving these numbers in your phone.  Go to AMEE for more information or to chat online.  Call your doctor or midwife now or seek immediate medical care if:    You have signs of hemorrhage (too much bleeding), such as:  Heavy vaginal bleeding. This means that you are soaking through one or more pads in an hour. Or you pass blood clots bigger than an egg.  Feeling dizzy or lightheaded, or you feel like you may faint.  Feeling so tired or weak that you cannot do your usual activities.  A fast or irregular heartbeat.  New or worse belly pain.     You have signs of infection, such as:  A fever.  Increased pain, swelling, warmth, or redness from an incision or wound.  Frequent or painful urination or blood in your urine.  Vaginal discharge that smells bad.  New or worse belly pain.     You have symptoms of a blood clot in your leg (called a deep vein thrombosis), such as:  Pain in the calf, back of the knee, thigh, or

## 2024-08-19 NOTE — PROGRESS NOTES
Post-Partum Day Number 1 Progress/Discharge Note    Patient doing well post-partum without significant complaint.  Voiding without difficulty, normal lochia, positive flatus.  Wishes discharge home.    Vitals:  Patient Vitals for the past 8 hrs:   BP Temp Temp src Pulse Resp SpO2   24 0710 115/71 97.9 °F (36.6 °C) Oral 73 16 98 %     Temp (24hrs), Av °F (36.7 °C), Min:97.7 °F (36.5 °C), Max:98.6 °F (37 °C)      Vital signs stable, afebrile.    Exam:  Patient without distress.               Abdomen soft, fundus firm at level of umbilicus, non tender               Lower extremities are negative for swelling, cords or tenderness.    Lab/Data Review:      Assessment and Plan:  Patient appears to be having uncomplicated post-partum course.  Continue routine perineal care and maternal education.  Plan discharge for today with follow up in our office in 2 weeks.

## 2024-08-19 NOTE — LACTATION NOTE
This note was copied from a baby's chart.  In to see mom and infant for the first time. Experienced mom stated that infant did latch and nurse well at her first feeding but has been sleepy since and not as interested. Reviewed with mom the expectations of the first 24 hours and informed her this is normal. Also reviewed the second night of life and discharge information as mom and infant are planning an early discharge to home this afternoon. Discussed pumping to store as well as introduction of the bottle. Encouraged mom to follow up with our outpatient lactation consultant as needed.

## 2024-08-19 NOTE — DISCHARGE SUMMARY
Obstetrical Discharge Summary     Name: Sarah Reilly MRN: [id number]  SSN: [id number]    YOB: 1995  Age: 28 y.o.  Sex: female      Allergies: Patient has no known allergies.    Admit Date: 2024    Discharge Date: 2024     Admitting Physician: Yari Barnhart MD     Attending Physician:  Sánchez Alba MD     * Admission Diagnoses: Normal labor [O80, Z37.9]    * Discharge Diagnoses:   Information for the patient's :  Jacques Reilly [[id number]]   @[phone]@     Additional Diagnoses:    Lab Results   Component Value Date/Time    ABORH A NEGATIVE 2024 03:34 AM    There is no immunization history for the selected administration types on file for this patient.    * Procedures: Vaginal delivery  * No surgery found *           * Discharge Condition: Good    * Hospital Course: Normal hospital course following the delivery.    * Disposition: Home    Discharge Medications:      Medication List        START taking these medications      ibuprofen 800 MG tablet  Commonly known as: ADVIL;MOTRIN  Take 1 tablet by mouth every 8 hours            CONTINUE taking these medications      acyclovir 400 MG tablet  Commonly known as: ZOVIRAX  Take one tablet by oral route twice daily as needed for genital outbreak     docusate sodium 100 MG capsule  Commonly known as: COLACE     IRON SLOW RELEASE PO     MIRALAX PO     PRENATAL 1 + IRON PO               Where to Get Your Medications        These medications were sent to [facility]/pharmacy #[id number] - [place], [place] - [address] - P [phone] - F [phone]  [address] [place] [id number]      Phone: [phone]   ibuprofen 800 MG tablet         * Follow-up Care/Patient Instructions:  Activity: no sex for 6 weeks, no driving while on analgesics, and no heavy lifting for 4 weeks  Diet: regular diet  Wound Care: keep wound clean and dry

## 2025-02-07 ENCOUNTER — OFFICE VISIT (OUTPATIENT)
Dept: ORTHOPEDIC SURGERY | Age: 30
End: 2025-02-07
Payer: MEDICAID

## 2025-02-07 ENCOUNTER — APPOINTMENT (OUTPATIENT)
Dept: GENERAL RADIOLOGY | Age: 30
End: 2025-02-07
Payer: MEDICAID

## 2025-02-07 ENCOUNTER — HOSPITAL ENCOUNTER (EMERGENCY)
Age: 30
Discharge: HOME | End: 2025-02-07
Attending: EMERGENCY MEDICINE
Payer: MEDICAID

## 2025-02-07 ENCOUNTER — TELEPHONE (OUTPATIENT)
Dept: ORTHOPEDIC SURGERY | Age: 30
End: 2025-02-07

## 2025-02-07 VITALS
SYSTOLIC BLOOD PRESSURE: 118 MMHG | DIASTOLIC BLOOD PRESSURE: 78 MMHG | RESPIRATION RATE: 20 BRPM | HEART RATE: 95 BPM | WEIGHT: 166 LBS | HEIGHT: 67 IN | TEMPERATURE: 97.8 F | BODY MASS INDEX: 26.06 KG/M2 | OXYGEN SATURATION: 96 %

## 2025-02-07 DIAGNOSIS — S81.812A LACERATION OF LEFT LOWER LEG, INITIAL ENCOUNTER: ICD-10-CM

## 2025-02-07 DIAGNOSIS — S86.022A LACERATION OF LEFT ACHILLES TENDON, INITIAL ENCOUNTER: Primary | ICD-10-CM

## 2025-02-07 DIAGNOSIS — S80.211A ABRASION OF RIGHT KNEE, INITIAL ENCOUNTER: ICD-10-CM
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
## 2025-02-07 DIAGNOSIS — S86.012A ACHILLES TENDON TEAR, LEFT, INITIAL ENCOUNTER: ICD-10-CM

## 2025-02-07 PROCEDURE — 73590 X-RAY EXAM OF LOWER LEG: CPT

## 2025-02-07 PROCEDURE — 99204 OFFICE O/P NEW MOD 45 MIN: CPT | Performed by: ORTHOPAEDIC SURGERY

## 2025-02-07 PROCEDURE — 99284 EMERGENCY DEPT VISIT MOD MDM: CPT

## 2025-02-07 PROCEDURE — 90471 IMMUNIZATION ADMIN: CPT | Performed by: EMERGENCY MEDICINE

## 2025-02-07 PROCEDURE — 6370000000 HC RX 637 (ALT 250 FOR IP): Performed by: EMERGENCY MEDICINE

## 2025-02-07 PROCEDURE — 73630 X-RAY EXAM OF FOOT: CPT

## 2025-02-07 PROCEDURE — 12002 RPR S/N/AX/GEN/TRNK2.6-7.5CM: CPT

## 2025-02-07 PROCEDURE — 6360000002 HC RX W HCPCS: Performed by: EMERGENCY MEDICINE

## 2025-02-07 PROCEDURE — 90714 TD VACC NO PRESV 7 YRS+ IM: CPT | Performed by: EMERGENCY MEDICINE

## 2025-02-07 RX ORDER — CEPHALEXIN 500 MG/1
500 CAPSULE ORAL
Status: COMPLETED | OUTPATIENT
Start: 2025-02-07 | End: 2025-02-07

## 2025-02-07 RX ORDER — IBUPROFEN 400 MG/1
400 TABLET, FILM COATED ORAL
Status: COMPLETED | OUTPATIENT
Start: 2025-02-07 | End: 2025-02-07

## 2025-02-07 RX ORDER — CEPHALEXIN 500 MG/1
500 CAPSULE ORAL 4 TIMES DAILY
Qty: 28 CAPSULE | Refills: 0 | Status: SHIPPED | OUTPATIENT
Start: 2025-02-07 | End: 2025-02-14

## 2025-02-07 RX ORDER — MORPHINE SULFATE 15 MG/1
7.5 TABLET ORAL EVERY 6 HOURS PRN
Qty: 6 TABLET | Refills: 0 | Status: SHIPPED | OUTPATIENT
Start: 2025-02-07 | End: 2025-02-10

## 2025-02-07 RX ORDER — LIDOCAINE HYDROCHLORIDE 10 MG/ML
5 INJECTION, SOLUTION INFILTRATION; PERINEURAL
Status: DISCONTINUED | OUTPATIENT
Start: 2025-02-07 | End: 2025-02-07 | Stop reason: HOSPADM

## 2025-02-07 RX ADMIN — IBUPROFEN 400 MG: 400 TABLET, FILM COATED ORAL at 01:52

## 2025-02-07 RX ADMIN — CEPHALEXIN 500 MG: 500 CAPSULE ORAL at 01:51

## 2025-02-07 RX ADMIN — CLOSTRIDIUM TETANI TOXOID ANTIGEN (FORMALDEHYDE INACTIVATED) AND CORYNEBACTERIUM DIPHTHERIAE TOXOID ANTIGEN (FORMALDEHYDE INACTIVATED) 0.5 ML: 5; 2 INJECTION, SUSPENSION INTRAMUSCULAR at 01:53

## 2025-02-07 ASSESSMENT — PAIN SCALES - GENERAL: PAINLEVEL_OUTOF10: 0

## 2025-02-07 ASSESSMENT — LIFESTYLE VARIABLES
HOW OFTEN DO YOU HAVE A DRINK CONTAINING ALCOHOL: PATIENT DECLINED
HOW MANY STANDARD DRINKS CONTAINING ALCOHOL DO YOU HAVE ON A TYPICAL DAY: PATIENT DECLINED

## 2025-02-07 ASSESSMENT — PAIN - FUNCTIONAL ASSESSMENT: PAIN_FUNCTIONAL_ASSESSMENT: 0-10
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
## 2025-02-07 NOTE — ED PROVIDER NOTES
Procedures     Lac Repair    Date/Time: 2/7/2025 2:29 AM    Performed by: Tali Cr PA-C  Authorized by: Ashwin Ahuja MD    Consent:     Consent obtained:  Verbal    Consent given by:  Patient    Risks discussed:  Infection, need for additional repair, poor wound healing, poor cosmetic result, pain, retained foreign body, tendon damage and vascular damage  Universal protocol:     Procedure explained and questions answered to patient or proxy's satisfaction: yes      Patient identity confirmed:  Verbally with patient and arm band  Anesthesia:     Anesthesia method:  Local infiltration    Local anesthetic:  Lidocaine 1% w/o epi  Laceration details:     Location:  Foot    Foot location:  L ankle    Length (cm):  3    Depth (mm):  5  Exploration:     Hemostasis achieved with:  Direct pressure    Imaging obtained: x-ray      Imaging outcome: foreign body not noted      Wound exploration: wound explored through full range of motion and entire depth of wound visualized      Wound extent: tendon damage      Wound extent: no foreign bodies/material noted, no underlying fracture noted and no vascular damage noted      Tendon damage location:  Lower extremity    Lower extremity tendon damage location:  Achilles    Tendon damage extent:  Complete transection    Tendon repair plan:  Refer for evaluation  Treatment:     Area cleansed with:  Saline    Amount of cleaning:  Extensive    Irrigation solution:  Sterile saline    Irrigation volume:  500 mL    Irrigation method:  Pressure wash and syringe  Skin repair:     Repair method:  Sutures    Suture size:  3-0    Suture material:  Prolene    Suture technique:  Simple interrupted    Number of sutures:  4  Approximation:     Approximation:  Close  Repair type:     Repair type:  Simple  Post-procedure details:     Dressing:  Non-adherent dressing and splint for protection    Procedure completion:  Tolerated well, no immediate complications     perfect and
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
## 2025-02-07 NOTE — DISCHARGE INSTRUCTIONS
Use walking boot at all times.  Leave dressing on for 24 hours then may clean 1-2 times daily gently with soap and water and apply over-the-counter bacitracin ointment and a nonadherent Band-Aid or bandage.  Follow-up with orthopedics when called with appointment time.  Call them Monday or Tuesday if you do not hear from them on Friday or Monday regarding this appointment.  Antibiotics as prescribed.  Tylenol and Motrin for pain.  Alternate them every 3-4 hours for best results.  Morphine 1/2 tablet every 6-8 hours if needed for severe breakthrough pain but no alcohol or driving while taking morphine.

## 2025-02-07 NOTE — ED NOTES
Abx ointment applied to wound, non-adherent dressing applied and coban used to wrap wound.  Size medium air boot applied to wounded lower extremity.     Ko Disla RN  02/07/25 0417

## 2025-02-07 NOTE — PERIOP NOTE
Patient verified name and .  Order for consent  was found in EHR and does not match case posting; patient verifies procedure. Case posting is missing wound debridement. Case message sent to surgeon's office pool and OR scheduling. Chart flagged for charge nurse to f/u.  Type 1B surgery, PAT phone assessment complete.  Orders received.  Labs per surgeon: none  Labs per anesthesia protocol: Patient states is coming to [address] [place] [address], [address] between 8:00 am - 3:30 pm Mon-Fri to have hgb drawn. Order placed in Epic and chart flagged for charge nurse      Patient answered medical/surgical history questions at their best of ability. All prior to admission medications documented in EPIC.    Patient instructed to continue taking all prescription medications up to the day of surgery but to take only the following medications the day of surgery according to anesthesia guidelines with a small sip of water: morphine (if needed), Zoloft. Also, patient is requested to take 2 Tylenol in the morning and then again before bed on the day before surgery. Regular or extra strength may be used.       Patient informed that all vitamins and supplements should be held 7 days prior to surgery and NSAIDS 5 days prior to surgery. Prescription meds to hold:Ibuprofen 5 days PTS, Vitamins and supplements.    Patient instructed on the following:    > Arrive at OPC Entrance, time of arrival to be called the day before by 1700  > NPO after midnight, unless otherwise indicated, including gum, mints, and ice chips. Per anesthesiology instructions, please drink 32 oz of water 2 hours prior to arrival to prevent dehydration.  > Responsible adult must drive patient to the hospital, stay during surgery, and patient will need supervision 24 hours after anesthesia  > Use non moisturizing soap in shower the night before surgery and on the morning of surgery  > All piercings must be removed prior to arrival.    > Leave all valuables (money  and jewelry) at home but bring insurance card and ID on DOS.   > You may be required to pay a deductible or co-pay on the day of your procedure. You can pre-pay by calling [phone] if your surgery is at the [facility] or [phone] if your surgery is at the [facility].  > Do not wear make-up, nail polish, lotions, cologne, perfumes, powders, or oil on skin. Artificial nails are not permitted.
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
## 2025-02-11 ENCOUNTER — ANESTHESIA EVENT (OUTPATIENT)
Dept: SURGERY | Age: 30
End: 2025-02-11
Payer: MEDICAID

## 2025-02-11 DIAGNOSIS — G89.18 POST-OP PAIN: Primary | ICD-10-CM

## 2025-02-11 RX ORDER — ONDANSETRON 4 MG/1
4 TABLET, FILM COATED ORAL DAILY PRN
Qty: 30 TABLET | Refills: 0 | Status: SHIPPED | OUTPATIENT
Start: 2025-02-11

## 2025-02-11 RX ORDER — OXYCODONE HYDROCHLORIDE 5 MG/1
5 TABLET ORAL EVERY 6 HOURS PRN
Qty: 20 TABLET | Refills: 0 | Status: SHIPPED | OUTPATIENT
Start: 2025-02-11 | End: 2025-02-16

## 2025-02-11 RX ORDER — DOCUSATE SODIUM 100 MG/1
100 CAPSULE, LIQUID FILLED ORAL DAILY PRN
Qty: 30 CAPSULE | Refills: 0 | Status: SHIPPED | OUTPATIENT
Start: 2025-02-11

## 2025-02-11 RX ORDER — SODIUM CHLORIDE 9 MG/ML
INJECTION, SOLUTION INTRAVENOUS PRN
Status: CANCELLED | OUTPATIENT
Start: 2025-02-11

## 2025-02-11 RX ORDER — ASPIRIN 81 MG/1
81 TABLET ORAL 2 TIMES DAILY
Qty: 21 TABLET | Refills: 0 | Status: SHIPPED | OUTPATIENT
Start: 2025-02-11

## 2025-02-11 NOTE — PERIOP NOTE
Preop department called to notify patient of arrival time for scheduled procedure. Instructions given to   - Arrive at [facility]PC Entrance [address].  - No solid food after midnight & Please drink 32 ounces of water 2 hours prior to your arrival to avoid dehydration unless otherwise indicated. No gum, mints, or ice chips.   - Have a responsible adult to drive patient to the hospital, stay during surgery, and patient will need supervision 24 hours after anesthesia.   - Use antibacterial soap in shower the night before surgery and on the morning of surgery.       Was patient contacted: yes, pt  Voicemail left: n/a  Numbers contacted: [phone]   Arrival time: 0745  Time to complete 32 ounces of water: 0545

## 2025-02-12 ENCOUNTER — HOSPITAL ENCOUNTER (OUTPATIENT)
Age: 30
Setting detail: OUTPATIENT SURGERY
Discharge: HOME | End: 2025-02-12
Attending: ORTHOPAEDIC SURGERY | Admitting: ORTHOPAEDIC SURGERY
Payer: MEDICAID

## 2025-02-12 ENCOUNTER — ANESTHESIA (OUTPATIENT)
Dept: SURGERY | Age: 30
End: 2025-02-12
Payer: MEDICAID

## 2025-02-12 VITALS
BODY MASS INDEX: 26.06 KG/M2 | OXYGEN SATURATION: 96 % | RESPIRATION RATE: 16 BRPM | HEART RATE: 70 BPM | WEIGHT: 166 LBS | DIASTOLIC BLOOD PRESSURE: 69 MMHG | HEIGHT: 67 IN | TEMPERATURE: 97.2 F | SYSTOLIC BLOOD PRESSURE: 109 MMHG

## 2025-02-12 DIAGNOSIS — Z01.818 PRE-OP TESTING: Primary | ICD-10-CM

## 2025-02-12 LAB — HCG UR QL: NEGATIVE

## 2025-02-12 PROCEDURE — 6360000002 HC RX W HCPCS: Performed by: ORTHOPAEDIC SURGERY

## 2025-02-12 PROCEDURE — 3700000001 HC ADD 15 MINUTES (ANESTHESIA): Performed by: ORTHOPAEDIC SURGERY

## 2025-02-12 PROCEDURE — 2500000003 HC RX 250 WO HCPCS: Performed by: NURSE ANESTHETIST, CERTIFIED REGISTERED

## 2025-02-12 PROCEDURE — 11043 DBRDMT MUSC&/FSCA 1ST 20/<: CPT | Performed by: ORTHOPAEDIC SURGERY

## 2025-02-12 PROCEDURE — C1713 ANCHOR/SCREW BN/BN,TIS/BN: HCPCS | Performed by: ORTHOPAEDIC SURGERY

## 2025-02-12 PROCEDURE — 6360000002 HC RX W HCPCS: Performed by: SURGERY

## 2025-02-12 PROCEDURE — 6360000002 HC RX W HCPCS: Performed by: NURSE ANESTHETIST, CERTIFIED REGISTERED

## 2025-02-12 PROCEDURE — 2580000003 HC RX 258: Performed by: SURGERY

## 2025-02-12 PROCEDURE — 2709999900 HC NON-CHARGEABLE SUPPLY: Performed by: ORTHOPAEDIC SURGERY

## 2025-02-12 PROCEDURE — 64447 NJX AA&/STRD FEMORAL NRV IMG: CPT | Performed by: SURGERY

## 2025-02-12 PROCEDURE — 3700000000 HC ANESTHESIA ATTENDED CARE: Performed by: ORTHOPAEDIC SURGERY

## 2025-02-12 PROCEDURE — 2780000002 HC MISC SCREW $0-50: Performed by: ORTHOPAEDIC SURGERY

## 2025-02-12 PROCEDURE — 7100000011 HC PHASE II RECOVERY - ADDTL 15 MIN: Performed by: ORTHOPAEDIC SURGERY

## 2025-02-12 PROCEDURE — 27650 REPAIR ACHILLES TENDON: CPT | Performed by: ORTHOPAEDIC SURGERY

## 2025-02-12 PROCEDURE — 64445 NJX AA&/STRD SCIATIC NRV IMG: CPT | Performed by: SURGERY

## 2025-02-12 PROCEDURE — 6370000000 HC RX 637 (ALT 250 FOR IP): Performed by: SURGERY

## 2025-02-12 PROCEDURE — 2720000010 HC SURG SUPPLY STERILE: Performed by: ORTHOPAEDIC SURGERY

## 2025-02-12 PROCEDURE — 3600000004 HC SURGERY LEVEL 4 BASE: Performed by: ORTHOPAEDIC SURGERY

## 2025-02-12 PROCEDURE — 81025 URINE PREGNANCY TEST: CPT

## 2025-02-12 PROCEDURE — 6360000002 HC RX W HCPCS: Performed by: PHYSICIAN ASSISTANT

## 2025-02-12 PROCEDURE — 3600000014 HC SURGERY LEVEL 4 ADDTL 15MIN: Performed by: ORTHOPAEDIC SURGERY

## 2025-02-12 PROCEDURE — 2500000003 HC RX 250 WO HCPCS: Performed by: PHYSICIAN ASSISTANT

## 2025-02-12 PROCEDURE — 7100000000 HC PACU RECOVERY - FIRST 15 MIN: Performed by: ORTHOPAEDIC SURGERY

## 2025-02-12 PROCEDURE — 7100000001 HC PACU RECOVERY - ADDTL 15 MIN: Performed by: ORTHOPAEDIC SURGERY

## 2025-02-12 PROCEDURE — 7100000010 HC PHASE II RECOVERY - FIRST 15 MIN: Performed by: ORTHOPAEDIC SURGERY

## 2025-02-12 DEVICE — PARS SUTURE IMPLANT KIT W/ SUTURETAPE
Type: IMPLANTABLE DEVICE | Site: ANKLE | Status: FUNCTIONAL
Brand: ARTHREX®

## 2025-02-12 DEVICE — IMPL SYS,BIO-COMP ACHILLES MID-SUBSTANCE
Type: IMPLANTABLE DEVICE | Site: ANKLE | Status: FUNCTIONAL
Brand: ARTHREX®

## 2025-02-12 RX ORDER — VANCOMYCIN HYDROCHLORIDE 1 G/20ML
INJECTION, POWDER, LYOPHILIZED, FOR SOLUTION INTRAVENOUS PRN
Status: DISCONTINUED | OUTPATIENT
Start: 2025-02-12 | End: 2025-02-12 | Stop reason: ALTCHOICE

## 2025-02-12 RX ORDER — IPRATROPIUM BROMIDE AND ALBUTEROL SULFATE 2.5; .5 MG/3ML; MG/3ML
1 SOLUTION RESPIRATORY (INHALATION)
Status: DISCONTINUED | OUTPATIENT
Start: 2025-02-12 | End: 2025-02-12 | Stop reason: HOSPADM

## 2025-02-12 RX ORDER — LABETALOL HYDROCHLORIDE 5 MG/ML
10 INJECTION, SOLUTION INTRAVENOUS
Status: DISCONTINUED | OUTPATIENT
Start: 2025-02-12 | End: 2025-02-12 | Stop reason: HOSPADM

## 2025-02-12 RX ORDER — FENTANYL CITRATE 50 UG/ML
100 INJECTION, SOLUTION INTRAMUSCULAR; INTRAVENOUS
Status: COMPLETED | OUTPATIENT
Start: 2025-02-12 | End: 2025-02-12

## 2025-02-12 RX ORDER — SODIUM CHLORIDE, SODIUM LACTATE, POTASSIUM CHLORIDE, CALCIUM CHLORIDE 600; 310; 30; 20 MG/100ML; MG/100ML; MG/100ML; MG/100ML
INJECTION, SOLUTION INTRAVENOUS CONTINUOUS
Status: DISCONTINUED | OUTPATIENT
Start: 2025-02-12 | End: 2025-02-12 | Stop reason: HOSPADM

## 2025-02-12 RX ORDER — LIDOCAINE HYDROCHLORIDE 20 MG/ML
INJECTION, SOLUTION EPIDURAL; INFILTRATION; INTRACAUDAL; PERINEURAL
Status: DISCONTINUED | OUTPATIENT
Start: 2025-02-12 | End: 2025-02-12 | Stop reason: SDUPTHER

## 2025-02-12 RX ORDER — ROPIVACAINE HYDROCHLORIDE 5 MG/ML
INJECTION, SOLUTION EPIDURAL; INFILTRATION; PERINEURAL
Status: COMPLETED | OUTPATIENT
Start: 2025-02-12 | End: 2025-02-12

## 2025-02-12 RX ORDER — SODIUM CHLORIDE 0.9 % (FLUSH) 0.9 %
5-40 SYRINGE (ML) INJECTION EVERY 12 HOURS SCHEDULED
Status: DISCONTINUED | OUTPATIENT
Start: 2025-02-12 | End: 2025-02-12 | Stop reason: HOSPADM

## 2025-02-12 RX ORDER — MIDAZOLAM HYDROCHLORIDE 2 MG/2ML
2 INJECTION, SOLUTION INTRAMUSCULAR; INTRAVENOUS
Status: COMPLETED | OUTPATIENT
Start: 2025-02-12 | End: 2025-02-12

## 2025-02-12 RX ORDER — ONDANSETRON 2 MG/ML
INJECTION INTRAMUSCULAR; INTRAVENOUS
Status: DISCONTINUED | OUTPATIENT
Start: 2025-02-12 | End: 2025-02-12 | Stop reason: SDUPTHER

## 2025-02-12 RX ORDER — LIDOCAINE HYDROCHLORIDE 10 MG/ML
1 INJECTION, SOLUTION INFILTRATION; PERINEURAL
Status: DISCONTINUED | OUTPATIENT
Start: 2025-02-12 | End: 2025-02-12 | Stop reason: HOSPADM

## 2025-02-12 RX ORDER — HALOPERIDOL 5 MG/ML
1 INJECTION INTRAMUSCULAR
Status: DISCONTINUED | OUTPATIENT
Start: 2025-02-12 | End: 2025-02-12 | Stop reason: HOSPADM

## 2025-02-12 RX ORDER — EPHEDRINE SULFATE 5 MG/ML
INJECTION INTRAVENOUS
Status: DISCONTINUED | OUTPATIENT
Start: 2025-02-12 | End: 2025-02-12 | Stop reason: SDUPTHER

## 2025-02-12 RX ORDER — DEXAMETHASONE SODIUM PHOSPHATE 10 MG/ML
INJECTION INTRAMUSCULAR; INTRAVENOUS
Status: DISCONTINUED | OUTPATIENT
Start: 2025-02-12 | End: 2025-02-12 | Stop reason: SDUPTHER

## 2025-02-12 RX ORDER — SODIUM CHLORIDE 0.9 % (FLUSH) 0.9 %
5-40 SYRINGE (ML) INJECTION PRN
Status: DISCONTINUED | OUTPATIENT
Start: 2025-02-12 | End: 2025-02-12 | Stop reason: HOSPADM

## 2025-02-12 RX ORDER — PROPOFOL 10 MG/ML
INJECTION, EMULSION INTRAVENOUS
Status: DISCONTINUED | OUTPATIENT
Start: 2025-02-12 | End: 2025-02-12 | Stop reason: SDUPTHER

## 2025-02-12 RX ORDER — PROCHLORPERAZINE EDISYLATE 5 MG/ML
5 INJECTION INTRAMUSCULAR; INTRAVENOUS
Status: DISCONTINUED | OUTPATIENT
Start: 2025-02-12 | End: 2025-02-12 | Stop reason: HOSPADM

## 2025-02-12 RX ORDER — NALOXONE HYDROCHLORIDE 0.4 MG/ML
INJECTION, SOLUTION INTRAMUSCULAR; INTRAVENOUS; SUBCUTANEOUS PRN
Status: DISCONTINUED | OUTPATIENT
Start: 2025-02-12 | End: 2025-02-12 | Stop reason: HOSPADM

## 2025-02-12 RX ORDER — OXYCODONE HYDROCHLORIDE 5 MG/1
5 TABLET ORAL
Status: COMPLETED | OUTPATIENT
Start: 2025-02-12 | End: 2025-02-12

## 2025-02-12 RX ORDER — SUCCINYLCHOLINE CHLORIDE 20 MG/ML
INJECTION INTRAMUSCULAR; INTRAVENOUS
Status: DISCONTINUED | OUTPATIENT
Start: 2025-02-12 | End: 2025-02-12 | Stop reason: SDUPTHER

## 2025-02-12 RX ADMIN — PROPOFOL 50 MG: 10 INJECTION, EMULSION INTRAVENOUS at 12:20

## 2025-02-12 RX ADMIN — OXYCODONE HYDROCHLORIDE 5 MG: 5 TABLET ORAL at 13:59

## 2025-02-12 RX ADMIN — EPHEDRINE SULFATE 7.5 MG: 5 INJECTION INTRAVENOUS at 11:59

## 2025-02-12 RX ADMIN — SODIUM CHLORIDE, POTASSIUM CHLORIDE, SODIUM LACTATE AND CALCIUM CHLORIDE: 600; 310; 30; 20 INJECTION, SOLUTION INTRAVENOUS at 09:56

## 2025-02-12 RX ADMIN — ROPIVACAINE HYDROCHLORIDE 10 ML: 5 INJECTION, SOLUTION EPIDURAL; INFILTRATION; PERINEURAL at 10:01

## 2025-02-12 RX ADMIN — Medication 140 MG: at 11:59

## 2025-02-12 RX ADMIN — LIDOCAINE HYDROCHLORIDE 50 MG: 20 INJECTION, SOLUTION EPIDURAL; INFILTRATION; INTRACAUDAL; PERINEURAL at 11:59

## 2025-02-12 RX ADMIN — MIDAZOLAM 2 MG: 1 INJECTION INTRAMUSCULAR; INTRAVENOUS at 09:55

## 2025-02-12 RX ADMIN — CEFAZOLIN 2000 MG: 2 INJECTION, POWDER, FOR SOLUTION INTRAMUSCULAR; INTRAVENOUS at 12:04

## 2025-02-12 RX ADMIN — ROPIVACAINE HYDROCHLORIDE 25 ML: 5 INJECTION, SOLUTION EPIDURAL; INFILTRATION; PERINEURAL at 09:58

## 2025-02-12 RX ADMIN — PROPOFOL 150 MG: 10 INJECTION, EMULSION INTRAVENOUS at 11:59

## 2025-02-12 RX ADMIN — FENTANYL CITRATE 100 MCG: 50 INJECTION INTRAMUSCULAR; INTRAVENOUS at 09:55

## 2025-02-12 RX ADMIN — ONDANSETRON 4 MG: 2 INJECTION INTRAMUSCULAR; INTRAVENOUS at 12:37

## 2025-02-12 RX ADMIN — DEXAMETHASONE SODIUM PHOSPHATE 10 MG: 10 INJECTION INTRAMUSCULAR; INTRAVENOUS at 12:17

## 2025-02-12 ASSESSMENT — PAIN DESCRIPTION - ORIENTATION: ORIENTATION: LEFT

## 2025-02-12 ASSESSMENT — PAIN DESCRIPTION - LOCATION: LOCATION: ANKLE

## 2025-02-12 ASSESSMENT — PAIN SCALES - GENERAL: PAINLEVEL_OUTOF10: 7

## 2025-02-12 NOTE — ANESTHESIA POSTPROCEDURE EVALUATION
Department of Anesthesiology  Postprocedure Note    Patient: Sarah Reilly  MRN: [id number]  YOB: 1995  Date of evaluation: 2/12/2025    Procedure Summary       Date: 02/12/25 Room / Location: [facility] OP OR 01 / [facility]    Anesthesia Start: 1149 Anesthesia Stop: 1325    Procedure: Left Achilles Repair with wound debridement (Left: Ankle) Diagnosis:       Achilles tendon tear, left, initial encounter      (Achilles tendon tear, left, initial encounter [S86.012A])    Surgeons: Jonas Mc MD Responsible Provider: Jaren Griffin MD    Anesthesia Type: General ASA Status: 1            Anesthesia Type: General    Juan Manuel Phase I: Juan Manuel Score: 9    Juan Manuel Phase II:      Anesthesia Post Evaluation    Patient location during evaluation: PACU  Patient participation: complete - patient participated  Level of consciousness: awake and alert  Airway patency: patent  Nausea & Vomiting: no nausea and no vomiting  Cardiovascular status: hemodynamically stable  Respiratory status: acceptable, nonlabored ventilation and spontaneous ventilation  Hydration status: euvolemic  Comments: /71   Pulse 79   Temp 97.2 °F (36.2 °C) (Temporal)   Resp 16   Ht 1.702 m (5' 7\")   Wt 75.3 kg (166 lb)   LMP  (Within Months)   SpO2 98%   BMI 26.00 kg/m²     Multimodal analgesia pain management approach  Pain management: adequate and satisfactory to patient    No notable events documented.

## 2025-02-12 NOTE — INTERVAL H&P NOTE
Update History & Physical    The Patient's History and Physical was reviewed   I discussed the surgery and patients medical condition with the patient.  The chart was reviewed with the patient and I examined the patient.    There was no change.  The surgical site was confirmed by the patient and me.    CV: RRR  RESP: CTAB    Plan:  The risk, benefits, expected outcome, and alternative to the recommended procedure have been discussed with the patient.  Patient understands and wants to proceed with the procedure.    Electronically signed by Jonas Mc MD on 02/12/25 10:10 AM

## 2025-02-12 NOTE — ANESTHESIA PROCEDURE NOTES
Peripheral Block    Patient location during procedure: pre-op  Reason for block: post-op pain management and at surgeon's request  Start time: 2/12/2025 10:00 AM  End time: 2/12/2025 10:01 AM  Staffing  Performed: anesthesiologist   Anesthesiologist: Jaren Griffin MD  Performed by: Jaren Griffin MD  Authorized by: Jaren Griffin MD    Preanesthetic Checklist  Completed: patient identified, IV checked, site marked, risks and benefits discussed, surgical/procedural consents, equipment checked, pre-op evaluation, timeout performed, anesthesia consent given, oxygen available, monitors applied/VS acknowledged, fire risk safety assessment completed and verbalized and blood product R/B/A discussed and consented  Peripheral Block   Patient position: supine  Prep: ChloraPrep  Provider prep: mask and sterile gloves  Patient monitoring: cardiac monitor, continuous pulse ox, frequent blood pressure checks, IV access and oxygen  Block type: Femoral  Adductor canal  Laterality: left  Injection technique: single-shot  Guidance: ultrasound guided    Needle   Needle type: insulated echogenic nerve stimulator needle   Needle gauge: 21 G  Needle localization: ultrasound guidance  Needle length: 10 cm  Assessment   Injection assessment: negative aspiration for heme, no paresthesia on injection, local visualized surrounding nerve on ultrasound and no intravascular symptoms  Paresthesia pain: none  Slow fractionated injection: yes  Hemodynamics: stable  Outcomes: patient tolerated procedure well and uncomplicated    Additional Notes  -Block placed for post op pain at surgeon's request.     -Ultrasound used to identify anatomy of nerve bundle.    -Needle placement and local injection at perineural area confirmed with real time ultrasound guidance.     -Local visualized with ultrasound surrounding nerve.    -Permanent Image taken and placed on chart.    Medications Administered  ropivacaine (NAROPIN) injection 0.5% - 
Peripheral Block    Patient location during procedure: pre-op  Reason for block: post-op pain management and at surgeon's request  Start time: 2/12/2025 9:55 AM  End time: 2/12/2025 9:58 AM  Staffing  Performed: anesthesiologist   Anesthesiologist: Jaren Griffin MD  Performed by: Jaren Griffin MD  Authorized by: Jaren Griffin MD    Preanesthetic Checklist  Completed: patient identified, IV checked, site marked, risks and benefits discussed, surgical/procedural consents, equipment checked, pre-op evaluation, timeout performed, anesthesia consent given, oxygen available, monitors applied/VS acknowledged, fire risk safety assessment completed and verbalized and blood product R/B/A discussed and consented  Peripheral Block   Patient position: right lateral decubitus  Prep: ChloraPrep  Provider prep: mask and sterile gloves  Patient monitoring: cardiac monitor, continuous pulse ox, frequent blood pressure checks, IV access and oxygen  Block type: Sciatic  Popliteal  Laterality: left  Injection technique: single-shot  Guidance: ultrasound guided    Needle   Needle type: insulated echogenic nerve stimulator needle   Needle gauge: 21 G  Needle localization: ultrasound guidance  Needle length: 10 cm  Assessment   Injection assessment: negative aspiration for heme, no paresthesia on injection, local visualized surrounding nerve on ultrasound and no intravascular symptoms  Paresthesia pain: none  Slow fractionated injection: yes  Hemodynamics: stable  Outcomes: patient tolerated procedure well and uncomplicated    Additional Notes  -Block placed for post op pain at surgeon's request.     -Ultrasound used to identify anatomy of nerve bundle.    -Needle placement and local injection at perineural area confirmed with real time ultrasound guidance.     -Local visualized with ultrasound surrounding nerve.    -Permanent Image taken and placed on chart.    Medications Administered  ropivacaine (NAROPIN) injection 0.5% 
16-Nov-2021

## 2025-02-12 NOTE — ANESTHESIA PRE PROCEDURE
Department of Anesthesiology  Preprocedure Note       Name:  Sarah Reilly   Age:  29 y.o.  :  1995                                          MRN:  [id number]         Date:  2025      Surgeon: Surgeon(s):  Jonas Mc MD    Procedure: Procedure(s):  Left PARS Achilles Repair    Medications prior to admission:   Prior to Admission medications    Medication Sig Start Date End Date Taking? Authorizing Provider   oxyCODONE (ROXICODONE) 5 MG immediate release tablet Take 1 tablet by mouth every 6 hours as needed for Pain for up to 5 days. Intended supply: 5 days. Take lowest dose possible to manage pain Max Daily Amount: 20 mg 25 Yes File, ZEV Dasilva   ondansetron (ZOFRAN) 4 MG tablet Take 1 tablet by mouth daily as needed for Nausea or Vomiting 25  Yes File, ZEV Dasilva   docusate sodium (COLACE) 100 MG capsule Take 1 capsule by mouth daily as needed for Constipation 25  Yes File, ZEV Dasilva   aspirin (ASPIRIN 81) 81 MG EC tablet Take 1 tablet by mouth in the morning and at bedtime 25  Yes File, ZEV Dasilva   cephALEXin (KEFLEX) 500 MG capsule Take 1 capsule by mouth 4 times daily for 7 days 25 Yes Ashwin Ahuja MD   sertraline (ZOLOFT) 50 MG tablet Take 1 tablet by mouth daily   Yes ProviderAlvaro MD   ibuprofen (ADVIL;MOTRIN) 800 MG tablet Take 1 tablet by mouth every 8 hours 24  Yes Alex Rubio MD   Polyethylene Glycol 3350 (MIRALAX PO) Take by mouth   Yes ProviderAlvaro MD   docusate sodium (COLACE) 100 MG capsule Take 1 capsule by mouth 2 times daily   Yes Alvaro Fallon MD   acyclovir (ZOVIRAX) 400 MG tablet Take one tablet by oral route twice daily as needed for genital outbreak 24  Yes Franck Childress MD   Prenatal Multivit-Min-Fe-FA (PRENATAL 1 + IRON PO) Take by mouth   Yes Alvaro Fallon MD       Current medications:    Current Facility-Administered Medications

## 2025-02-12 NOTE — OP NOTE
Operative Note    Patient:Sarah Reilly  MRN: [id number]    Date Of Surgery: 2/12/2025    Surgeon: Jonas Mc MD    Assistant Surgeon: None    Procedure Performed:   left Primary Achilles Repair  Debridement left wounds    Pre Op Diagnosis:  left Midsubstance Achilles Tear -open traumatic    Post Op Diagnosis:   same    Implants:   Implant Name Type Inv. Item Serial No.  Lot No. LRB No. Used Action   SYSTEM IMPL TEND 4.75MM SWIVELOCK BAN SUT LASSO W/ NIT WIRE - [id number]  SYSTEM IMPL TEND 4.75MM SWIVELOCK BAN SUT LASSO W/ NIT WIRE  [employer] [id number] Left 1 Implanted   SYSTEM IMPL INCL NDL 1.3MM WHT WHT/BLUE WHT/BLACK - [id number]  SYSTEM IMPL INCL NDL 1.3MM WHT WHT/BLUE WHT/BLACK  [employer] [id number] Left 1 Implanted       Anesthesia:  Regional w/ popliteal saphenous block    Blood Loss:  50cc    Tourniquet:  0    Complications:  -    Pre Operative Abx:   Ancef 2g    Specimens/Cultures:  none    Significant Findings:  Completely laceration of the Achilles tendon with retraction.  It was a clean cut-almost surgical.     Pre Operative Course:  Sarah Reilly is a 29 y.o. female who sustained anachilles open laceration.  He was washed out in the ED and presented my office.  Decision made to proceed with surgery.  After evaluation in my office the decision for primary repair was made.  I discussed the risk, benefits and alteratives to include non operative treatment.  They elected to proceed with primary repair.      Operation In Detail:  Patient was evaluated in the preoperative area.  The left lower extremity was marked by me.  We had a long discussion about the procedure and postoperative protocols.  The patient was then brought back to the operating room suite and placed in the operating room table.  A timeout was taken to identify the patient, procedure being performed, and laterality.  The patient was positioned prone.  All extremities padded well with no pressure.  After this the  hole 5 needle.  I then pulled all the needles through and passed the sutures.  I then was able to pull the jig and pulled the sutures out the end of the tendon sheath.  The each had good feel and good tension.  I cycled several times to make sure that the tendon had good tension.  There is no signs of pull-through or shift.  I am then evened out each suture.  I then organize them with a white up top, blue second, green third and fourth and black at the more distal.  I then wrapped my blue suture around the 2 green ones in the right side and passed through the green suture hole at the same thing on the contralateral side.  I then passed my blue suture through the tendon using the green sutures and passed off the green sutures.  Now my blue suture was locked in the tendon.  I pulled it several times and measure a lot tight.  After good pull I pulled tension several more times and cycled it.  I copiously irrigate out the wound.  The tendon moved well and there is good fixation the proximal tendon.  I then placed the tendon back into the proximal incision into the tendon sheath and I I then pulled the wires through the distal open incision and pulled the the sutures..  The tendon then went back of the tendon sheath with the sutures coming out the traumatic open incision.     Taking a 15 blade knife and scissors I did debride the end of the tendon as well.  This was excisional debridement of the tendon using 15 blade knife and scissors.  I did this at the most proximal and distal aspect of the Achilles tendon.    I then made a small incision medially and laterally at the insertion of the Achilles on the calcaneus bone.  I bluntly spread down to the calcaneus bone and palpated it.  And then through each hole drilled and tapped for a anchor.  I then plantarflex the foot.  There is noted to be great tendon apposition.    Through each hole I passed the suture passer through the tendon medially and laterally.  I then lasso

## 2025-02-14 ENCOUNTER — TELEPHONE (OUTPATIENT)
Dept: ORTHOPEDIC SURGERY | Age: 30
End: 2025-02-14

## 2025-02-14 NOTE — TELEPHONE ENCOUNTER
She is in a lot of pain and the oxy isn't helping. The only thing that helps is the morphine she got in the hospital when she first had the injury. Please call.

## 2025-02-17 DIAGNOSIS — G89.18 POST-OP PAIN: Primary | ICD-10-CM

## 2025-02-17 RX ORDER — OXYCODONE HYDROCHLORIDE 5 MG/1
5 TABLET ORAL EVERY 6 HOURS PRN
Qty: 20 TABLET | Refills: 0 | Status: SHIPPED | OUTPATIENT
Start: 2025-02-17 | End: 2025-02-22

## 2025-02-17 NOTE — TELEPHONE ENCOUNTER
She is requesting a refill on Oxycodone to [facility] on [facility].

## 2025-03-06 ENCOUNTER — OFFICE VISIT (OUTPATIENT)
Dept: ORTHOPEDIC SURGERY | Age: 30
End: 2025-03-06

## 2025-03-06 DIAGNOSIS — G89.18 POST-OP PAIN: Primary | ICD-10-CM

## 2025-03-06 PROCEDURE — 99024 POSTOP FOLLOW-UP VISIT: CPT | Performed by: PHYSICIAN ASSISTANT

## 2025-03-06 PROCEDURE — M5007 MISC HEEL LIFT: HCPCS | Performed by: PHYSICIAN ASSISTANT

## 2025-03-06 PROCEDURE — M5002 MISC BOOT SOCK: HCPCS | Performed by: PHYSICIAN ASSISTANT

## 2025-03-06 RX ORDER — HYDROCODONE BITARTRATE AND ACETAMINOPHEN 5; 325 MG/1; MG/1
1 TABLET ORAL EVERY 6 HOURS PRN
Qty: 28 TABLET | Refills: 0 | Status: SHIPPED | OUTPATIENT
Start: 2025-03-06 | End: 2025-03-13

## 2025-03-06 NOTE — PROGRESS NOTES
Patient was given an extra Boot Sock for the left foot.  The patient was prescribed a walker boot and ½” heel lift for the patient's left foot. The patient wears a size 8 shoe and I fitted them with a M size boot. There was also a pad placed on the Achilles to prevent from rubbing while in the boot. It was also stressed that the patient take the boot/sock off as much as possible to prevent infection and to insure healing. The patient was told to peel the heel lift every 3-5 days. The patient was fitted and instructed on the use of prescribed walker boot. I explained how to fit themselves and that the plastic flexible piece should always be on the front of the boot and secured by the Velcro straps on top. The air bladder in the boot was adjusted according to proper fit and comfort. The patient was instructed to walk heel toe once the patient starts applying pressure. I also explained that they need a heel lift or a higher heeled shoe for the uninvolved LE to help normalize gait and avoid excessive low back stress/strain due to leg length inequality created from walker boot. Patient read and signed documenting they understand and agree to [facility] current DME return policy.Patient read and signed documenting they understand and agree to [facility] current DME return policy.

## 2025-03-06 NOTE — PROGRESS NOTES
Name: Sarah Reilly  YOB: 1995  Gender: female  MRN: [id number]      Procedure: Left Achilles Repair with wound debridement - Left    Surgery Date: 2/12/2025    Subjective: Denies fevers chills or infection.  Denies any signs of spreading erythema.  Has been compliant with nonweightbearing.  Presents today using a iwalkfor ambulation.    ROS: Patient Denies fever/chills, headache, visual changes, chest pain, shortness of breath, and nausea/vomiting/diarrhea     Exam:   Gen: AAOx3 NAD  Resp: CTAB - no wheezing  Card: RRR  Eyes: sclera non icteric    Wound healing appropriately.  Sutures removed.  No signs of dehiscence or infection.  No signs of erythema or drainage.  Neurovascularly intact of the foot.  With toes warm and well-perfused.  When laying prone and squeezing the calf the foot plantar flexes.  The resting tension of the operative ankle/foot is about 10deg more plantar flexed than the contralateral side.  Palpation of the Achilles tendon shows no gapping or signs of tearing.  No signs of sural nerve damage.  FHL, FDL, anterior tib, posterior tib intact. +silt to s/s/sp/dp/t.     Imaging:   No Imaging taken today      Assessment/Plan:    2 weeks postoperatively the plan is to increase weightbearing slowly.    Patient placed into a cam walker boot with 2 heel lifts.  We will increase weightbearing every week over the next 4 weeks.    The first week is 25% weightbearing in the boot with 2 heel lifts.  The second week is 50% weightbearing.  The third week is 75% weightbearing.  The fourth week will be 100% weightbearing in the cam walker boot with 2 heel lifts.  At this point I will see them back and we will progress to more weightbearing in the boot.  We will also slow transition into a shoe at this point.  Norco 5 mg given today.    Physical therapy will be started at next visit.    I was explicit about not walking without the boot with a heel lift.  I explained no standing, walking, are

## 2025-03-17 DIAGNOSIS — G89.18 POST-OP PAIN: Primary | ICD-10-CM

## 2025-03-17 RX ORDER — HYDROCODONE BITARTRATE AND ACETAMINOPHEN 5; 325 MG/1; MG/1
1 TABLET ORAL EVERY 6 HOURS PRN
Qty: 28 TABLET | Refills: 0 | Status: SHIPPED | OUTPATIENT
Start: 2025-03-17 | End: 2025-03-24

## 2025-03-20 ENCOUNTER — TELEPHONE (OUTPATIENT)
Dept: ORTHOPEDIC SURGERY | Age: 30
End: 2025-03-20

## 2025-03-20 NOTE — TELEPHONE ENCOUNTER
She needs to speak to someone about getting a note about her surgery and how long she will be out. Please return her call.

## 2025-03-21 ENCOUNTER — TELEPHONE (OUTPATIENT)
Dept: ORTHOPEDIC SURGERY | Age: 30
End: 2025-03-21

## 2025-03-21 NOTE — TELEPHONE ENCOUNTER
Patient left msg asking for a work note stating when she had surgery and how long she will be out, says if at all possible she needs by 3 today.

## 2025-03-21 NOTE — TELEPHONE ENCOUNTER
Attempted to call pt and unable to LVM. If pt calls back, please tell her to check her mychart for work note or come by office to obtain from chart. Work note 3/21 is posted in her chart with the requested information.

## 2025-03-21 NOTE — TELEPHONE ENCOUNTER
Letter by Tobias, ZEV Dasilva on 3/21/2025  Last edited by Brittani Jeffrey MA today at 2:41 PM   ZEV Redding [facility] [place] [id number]  [phone]                  March 21, 2025        Sarah Reilly YOB: 1995   [address] [address] Date of Visit:  3/21/2025         To Whom It May Concern:     Sarah Reilly had surgery on 2/12/2025 and will need to be out of work for post-operative recovery until medically released.      It is my medical opinion that Sarah Reilly  should remain out of work until her next orthopedic follow-up on 4/8/25 at which time, her ability to  return to work or need for restrictions will be reassessed .     If you have any questions or concerns, please don't hesitate to call.     Sincerely,           ZEV Redding Zoe      Page 1 of 1

## 2025-03-21 NOTE — TELEPHONE ENCOUNTER
Letter by Tobias, ZEV Dasilva on 3/21/2025  Last edited by Brittani Jeffrey MA today at 2:41 PM   ZEV Redding [facility] [place] [id number]  [phone]                  March 21, 2025        Sarah Reilly YOB: 1995   [address] [address] Date of Visit:  3/21/2025         To Whom It May Concern:     Sarah Reilly had surgery on 2/12/2025 and will need to be out of work for post-operative recovery until medically released.      It is my medical opinion that Sarah Reilly  should remain out of work until her next orthopedic follow-up on 4/8/25 at which time, her ability to  return to work or need for restrictions will be reassessed .     If you have any questions or concerns, please don't hesitate to call.     Sincerely,           ZEV [place] Zoe      Page 1 of 1

## 2025-04-09 ASSESSMENT — PROMIS GLOBAL HEALTH SCALE
WHO IS THE PERSON COMPLETING THE PROMIS V1.1 SURVEY?: SELF
IN GENERAL, HOW WOULD YOU RATE YOUR MENTAL HEALTH, INCLUDING YOUR MOOD AND YOUR ABILITY TO THINK [ON A SCALE OF 1 (POOR) TO 5 (EXCELLENT)]?: VERY GOOD
IN GENERAL, PLEASE RATE HOW WELL YOU CARRY OUT YOUR USUAL SOCIAL ACTIVITIES (INCLUDES ACTIVITIES AT HOME, AT WORK, AND IN YOUR COMMUNITY, AND RESPONSIBILITIES AS A PARENT, CHILD, SPOUSE, EMPLOYEE, FRIEND, ETC) [ON A SCALE OF 1 (POOR) TO 5 (EXCELLENT)]?: VERY GOOD
IN THE PAST 7 DAYS, HOW WOULD YOU RATE YOUR PAIN ON AVERAGE [ON A SCALE FROM 0 (NO PAIN) TO 10 (WORST IMAGINABLE PAIN)]?: 7
IN THE PAST 7 DAYS, HOW WOULD YOU RATE YOUR PAIN ON AVERAGE [ON A SCALE FROM 0 (NO PAIN) TO 10 (WORST IMAGINABLE PAIN)]?: 7
IN GENERAL, PLEASE RATE HOW WELL YOU CARRY OUT YOUR USUAL SOCIAL ACTIVITIES (INCLUDES ACTIVITIES AT HOME, AT WORK, AND IN YOUR COMMUNITY, AND RESPONSIBILITIES AS A PARENT, CHILD, SPOUSE, EMPLOYEE, FRIEND, ETC) [ON A SCALE OF 1 (POOR) TO 5 (EXCELLENT)]?: VERY GOOD
IN GENERAL, HOW WOULD YOU RATE YOUR SATISFACTION WITH YOUR SOCIAL ACTIVITIES AND RELATIONSHIPS [ON A SCALE OF 1 (POOR) TO 5 (EXCELLENT)]?: VERY GOOD
IN THE PAST 7 DAYS, HOW WOULD YOU RATE YOUR FATIGUE ON AVERAGE [ON A SCALE FROM 1 (NONE) TO 5 (VERY SEVERE)]?: MODERATE
HOW IS THE PROMIS V1.1 BEING ADMINISTERED?: ELECTRONIC
TO WHAT EXTENT ARE YOU ABLE TO CARRY OUT YOUR EVERYDAY PHYSICAL ACTIVITIES SUCH AS WALKING, CLIMBING STAIRS, CARRYING GROCERIES, OR MOVING A CHAIR [ON A SCALE OF 1 (NOT AT ALL) TO 5 (COMPLETELY)]?: MODERATELY
WHO IS THE PERSON COMPLETING THE PROMIS V1.1 SURVEY?: SELF
TO WHAT EXTENT ARE YOU ABLE TO CARRY OUT YOUR EVERYDAY PHYSICAL ACTIVITIES SUCH AS WALKING, CLIMBING STAIRS, CARRYING GROCERIES, OR MOVING A CHAIR [ON A SCALE OF 1 (NOT AT ALL) TO 5 (COMPLETELY)]?: MODERATELY
IN GENERAL, HOW WOULD YOU RATE YOUR PHYSICAL HEALTH [ON A SCALE OF 1 (POOR) TO 5 (EXCELLENT)]?: EXCELLENT
IN THE PAST 7 DAYS, HOW WOULD YOU RATE YOUR FATIGUE ON AVERAGE [ON A SCALE FROM 1 (NONE) TO 5 (VERY SEVERE)]?: MODERATE
HOW IS THE PROMIS V1.1 BEING ADMINISTERED?: ELECTRONIC
IN THE PAST 7 DAYS, HOW OFTEN HAVE YOU BEEN BOTHERED BY EMOTIONAL PROBLEMS, SUCH AS FEELING ANXIOUS, DEPRESSED, OR IRRITABLE [ON A SCALE FROM 1 (NEVER) TO 5 (ALWAYS)]?: SOMETIMES
SUM OF RESPONSES TO QUESTIONS 2, 4, 5, & 10: 16
SUM OF RESPONSES TO QUESTIONS 3, 6, 7, & 8: 18
IN GENERAL, WOULD YOU SAY YOUR QUALITY OF LIFE IS...[ON A SCALE OF 1 (POOR) TO 5 (EXCELLENT)]: EXCELLENT
IN GENERAL, WOULD YOU SAY YOUR HEALTH IS...[ON A SCALE OF 1 (POOR) TO 5 (EXCELLENT)]: EXCELLENT
IN THE PAST 7 DAYS, HOW OFTEN HAVE YOU BEEN BOTHERED BY EMOTIONAL PROBLEMS, SUCH AS FEELING ANXIOUS, DEPRESSED, OR IRRITABLE [ON A SCALE FROM 1 (NEVER) TO 5 (ALWAYS)]?: SOMETIMES
IN GENERAL, HOW WOULD YOU RATE YOUR SATISFACTION WITH YOUR SOCIAL ACTIVITIES AND RELATIONSHIPS [ON A SCALE OF 1 (POOR) TO 5 (EXCELLENT)]?: VERY GOOD

## 2025-04-10 ENCOUNTER — OFFICE VISIT (OUTPATIENT)
Dept: ORTHOPEDIC SURGERY | Age: 30
End: 2025-04-10

## 2025-04-10 DIAGNOSIS — G89.18 POST-OP PAIN: Primary | ICD-10-CM

## 2025-04-10 DIAGNOSIS — S86.012A ACHILLES TENDON TEAR, LEFT, INITIAL ENCOUNTER: ICD-10-CM

## 2025-04-10 PROCEDURE — 99024 POSTOP FOLLOW-UP VISIT: CPT | Performed by: PHYSICIAN ASSISTANT

## 2025-04-10 RX ORDER — HYDROCODONE BITARTRATE AND ACETAMINOPHEN 5; 325 MG/1; MG/1
1 TABLET ORAL EVERY 6 HOURS PRN
Qty: 18 TABLET | Refills: 0 | Status: SHIPPED | OUTPATIENT
Start: 2025-04-10 | End: 2025-04-17

## 2025-04-10 NOTE — PROGRESS NOTES
Name: Sarah Reilly  YOB: 1995  Gender: female  MRN: [id number]      Procedure: Left Achilles Repair with wound debridement - Left    Surgery Date: 2/12/2025    Subjective: Denies fevers chills or infection.  Denies any signs of spreading erythema.  Has been compliant with nonweightbearing.  Presents today using a iwalkfor ambulation.    4/10/2025-patient states she is doing well.  She denies any significant pain.  She does note that she has been extremely hesitant to put weight through her foot.  She has been mainly just touchdown weightbearing.    ROS: Patient Denies fever/chills, headache, visual changes, chest pain, shortness of breath, and nausea/vomiting/diarrhea     Exam:   Gen: AAOx3 NAD  Resp: CTAB - no wheezing  Card: RRR  Eyes: sclera non icteric    Wound healed.  No signs of dehiscence or infection.  No signs of erythema or drainage.  Neurovascularly intact of the foot.  With toes warm and well-perfused.  When laying prone and squeezing the calf the foot plantar flexes.  The resting tension of the operative ankle/foot is about 10deg more plantar flexed than the contralateral side.  Palpation of the Achilles tendon shows no gapping or signs of tearing.  No signs of sural nerve damage.  FHL, FDL, anterior tib, posterior tib intact. +silt to s/s/sp/dp/t.     Imaging:   No Imaging taken today      Assessment/Plan:    She will increase weightbearing in the boot and slowly transition out of the boot to a regular shoe with heel lifts.  She will remove 1 layer of heel lift per week over 6 weeks once she is in regular shoe.    Begin formal physical therapy  Norco 5 mg    F/u 6 weeks w/o XR

## 2025-04-15 ENCOUNTER — HOSPITAL ENCOUNTER (OUTPATIENT)
Dept: PHYSICAL THERAPY | Age: 30
Setting detail: RECURRING SERIES
Discharge: HOME | End: 2025-04-17
Payer: MEDICAID

## 2025-04-15 DIAGNOSIS — R26.89 OTHER ABNORMALITIES OF GAIT AND MOBILITY: Primary | ICD-10-CM

## 2025-04-15 DIAGNOSIS — M25.572 PAIN IN JOINT INVOLVING LEFT ANKLE AND FOOT: ICD-10-CM

## 2025-04-15 PROCEDURE — 97162 PT EVAL MOD COMPLEX 30 MIN: CPT

## 2025-04-15 NOTE — PROGRESS NOTES
Sarah Reilly  : 1995  Primary: [employer] (Medicaid Managed)  Secondary:  [facility] [place] [address] [place] [id number]  Phone: [phone]  Fax: [phone] Plan Frequency: 2x/wk    Plan of Care/Certification Expiration Date: 25        Plan of Care/Certification Expiration Date:  Plan of Care/Certification Expiration Date: 25    Frequency/Duration: Plan Frequency: 2x/wk      Time In/Out:   Time In: 1110  Time Out: 1140      PT Visit Info:    Plan Frequency: 2x/wk  Progress Note Counter: 1      Visit Count:  1    OUTPATIENT PHYSICAL THERAPY:   Treatment Note 4/15/2025       Episode  (Left Ankle)               Treatment Diagnosis:    Other abnormalities of gait and mobility  Pain in joint involving left ankle and foot  Medical/Referring Diagnosis:    Post-op pain [G89.18]  Achilles tendon tear, left, initial encounter [S86.012A]    Referring Provider:  Pat Collado PA  Orders:  PT Eval and Treat   Return Appt:     Date of Onset:  Onset Date: 25     Allergies:   Patient has no known allergies.  Restrictions/Precautions:   Per PA note: \"increase weightbearing in the boot and slowly transition out of the boot to a regular shoe with heel lifts. She will remove 1 layer of heel lift per week over 6 weeks once she is in regular shoe. \"      Interventions Planned (Treatment may consist of any combination of the following):     See Assessment Note    REASON FOR TREATMENT:  left achilles repair and wound debridement on 25. She is ambulating with a CAM boot and heel lift. Encouraged her to bring a left shoe so we can practice weaning from her boot.     Functional limitations reported at initial Eval:  pain/difficulty with walking, sit to stand, descending steps, caring for her child, and prolonged standing.     Subjective Comments: see eval.    Progress Note Counter: 1     Initial Pain Level::      3/10   Post

## 2025-04-15 NOTE — PROGRESS NOTES
Sarah Reilly  : 1995  Primary: [employer] (Medicaid Managed)  Secondary:  [facility] [place] [address] [place] [address]  Phone: [phone]  Fax: [phone] Plan Frequency: 2x/wk    Plan of Care/Certification Expiration Date: 25        Plan of Care/Certification Expiration Date:  Plan of Care/Certification Expiration Date: 25    Frequency/Duration: Plan Frequency: 2x/wk      Time In/Out:   Time In: 1110  Time Out: 1140      PT Visit Info:    Plan Frequency: 2x/wk  Progress Note Counter: 1      Visit Count:  1                OUTPATIENT PHYSICAL THERAPY:             Initial Assessment 4/15/2025               Episode (Left Ankle)         Treatment Diagnosis:     Other abnormalities of gait and mobility  Pain in joint involving left ankle and foot  Medical/Referring Diagnosis:    Post-op pain [G89.18]  Achilles tendon tear, left, initial encounter [S86.012A]    Surgeon: Jonas Mc MD   Referring Provider:  Pat Collado PA  Orders:  PT Eval and Treat   Return Appt:    Date of Onset:  Onset Date: 25     Allergies:  Patient has no known allergies.  Restrictions/Precautions:    Per PA note: \"increase weightbearing in the boot and slowly transition out of the boot to a regular shoe with heel lifts. She will remove 1 layer of heel lift per week over 6 weeks once she is in regular shoe. \"      Medications Last Reviewed:  4/15/2025     SUBJECTIVE   History of Injury/Illness (Reason for Referral):  Pt is a 30 yo female referred to physical therapy due to post op pain. Pt underwent a left achilles repair and wound debridement on 25. She sustained her injury in February slipping on a broken glass that severed her left achilles tendon. She says she is very motivated to start PT and get back to her prior level of function.    Pain/Symptom Location: over incisions          Aggravating Factors/ Functional

## 2025-04-21 ENCOUNTER — HOSPITAL ENCOUNTER (OUTPATIENT)
Dept: PHYSICAL THERAPY | Age: 30
Setting detail: RECURRING SERIES
Discharge: HOME | End: 2025-04-23
Payer: MEDICAID

## 2025-04-21 PROCEDURE — 97110 THERAPEUTIC EXERCISES: CPT

## 2025-04-21 PROCEDURE — 97140 MANUAL THERAPY 1/> REGIONS: CPT

## 2025-04-21 NOTE — PROGRESS NOTES
Sarah Reilly  : 1995  Primary: [employer] (Medicaid Managed)  Secondary:  [facility] [place] [address] [place] [address]  Phone: [phone]  Fax: [phone] Plan Frequency: 2x/wk    Plan of Care/Certification Expiration Date: 25        Plan of Care/Certification Expiration Date:  Plan of Care/Certification Expiration Date: 25    Frequency/Duration: Plan Frequency: 2x/wk      Time In/Out:   Time In: 1147  Time Out: 1230      PT Visit Info:    Plan Frequency: 2x/wk  Progress Note Counter: 2      Visit Count:  2    OUTPATIENT PHYSICAL THERAPY:   Treatment Note 2025       Episode  (Left Ankle)               Treatment Diagnosis:    Other abnormalities of gait and mobility  Pain in joint involving left ankle and foot  Medical/Referring Diagnosis:    Post-op pain [G89.18]  Achilles tendon tear, left, initial encounter [S86.012A]    Referring Provider:  Pat Collado PA  Orders:  PT Eval and Treat   Return Appt:   25    Date of Onset:  Onset Date: 25     Allergies:   Patient has no known allergies.  Restrictions/Precautions:   Per PA note: \"increase weightbearing in the boot and slowly transition out of the boot to a regular shoe with heel lifts. She will remove 1 layer of heel lift per week over 6 weeks once she is in regular shoe. \"      Interventions Planned (Treatment may consist of any combination of the following):     See Assessment Note    REASON FOR TREATMENT:  left achilles repair and wound debridement on 25. She is ambulating with a CAM boot and heel lift. Encouraged her to bring a left shoe so we can practice weaning from her boot.     Functional limitations reported at initial Eval:  pain/difficulty with walking, sit to stand, descending steps, caring for her child, and prolonged standing.     Subjective Comments: She is wearing 3 layers in sneaker; feels pretty good.     Progress Note

## 2025-04-24 ENCOUNTER — HOSPITAL ENCOUNTER (OUTPATIENT)
Dept: PHYSICAL THERAPY | Age: 30
Setting detail: RECURRING SERIES
Discharge: HOME | End: 2025-04-26
Payer: MEDICAID

## 2025-04-24 PROCEDURE — 97140 MANUAL THERAPY 1/> REGIONS: CPT

## 2025-04-24 PROCEDURE — 97530 THERAPEUTIC ACTIVITIES: CPT

## 2025-04-24 PROCEDURE — 97110 THERAPEUTIC EXERCISES: CPT

## 2025-04-24 NOTE — PROGRESS NOTES
Sarah Reilly  : 1995  Primary: [employer] (Medicaid Managed)  Secondary:  [facility] [place] [address] [place] [id number]  Phone: [phone]  Fax: [phone] Plan Frequency: 2x/wk    Plan of Care/Certification Expiration Date: 25        Plan of Care/Certification Expiration Date:  Plan of Care/Certification Expiration Date: 25    Frequency/Duration: Plan Frequency: 2x/wk      Time In/Out:   Time In: 1147  Time Out: 1225      PT Visit Info:    Plan Frequency: 2x/wk  Progress Note Counter: 3      Visit Count:  3    OUTPATIENT PHYSICAL THERAPY:   Treatment Note 2025       Episode  (Left Ankle)               Treatment Diagnosis:    Other abnormalities of gait and mobility  Pain in joint involving left ankle and foot  Medical/Referring Diagnosis:    Post-op pain [G89.18]  Achilles tendon tear, left, initial encounter [S86.012A]    Referring Provider:  Pat Collado PA  Orders:  PT Eval and Treat   Return Appt:   25    Date of Onset:  Onset Date: 25     Allergies:   Patient has no known allergies.  Restrictions/Precautions:   Per PA note: \"increase weightbearing in the boot and slowly transition out of the boot to a regular shoe with heel lifts. She will remove 1 layer of heel lift per week over 6 weeks once she is in regular shoe. \"      Interventions Planned (Treatment may consist of any combination of the following):     See Assessment Note    REASON FOR TREATMENT:  left achilles repair and wound debridement on 25. She is ambulating with a CAM boot and heel lift. Encouraged her to bring a left shoe so we can practice weaning from her boot.     Functional limitations reported at initial Eval:  pain/difficulty with walking, sit to stand, descending steps, caring for her child, and prolonged standing.     Subjective Comments: Pt forgot her heel lift today. She is compliant with her HEP.     Progress

## 2025-05-01 ENCOUNTER — HOSPITAL ENCOUNTER (OUTPATIENT)
Dept: PHYSICAL THERAPY | Age: 30
Setting detail: RECURRING SERIES
Discharge: HOME | End: 2025-05-03
Payer: MEDICAID

## 2025-05-01 ENCOUNTER — HOSPITAL ENCOUNTER (EMERGENCY)
Age: 30
Discharge: HOME | End: 2025-05-01
Payer: MEDICAID

## 2025-05-01 VITALS
WEIGHT: 160 LBS | DIASTOLIC BLOOD PRESSURE: 88 MMHG | RESPIRATION RATE: 16 BRPM | TEMPERATURE: 97.9 F | HEART RATE: 102 BPM | OXYGEN SATURATION: 97 % | SYSTOLIC BLOOD PRESSURE: 131 MMHG | HEIGHT: 67 IN | BODY MASS INDEX: 25.11 KG/M2

## 2025-05-01 DIAGNOSIS — S86.012A RUPTURE OF LEFT ACHILLES TENDON, INITIAL ENCOUNTER: Primary | ICD-10-CM

## 2025-05-01 PROCEDURE — 2500000003 HC RX 250 WO HCPCS: Performed by: PHYSICIAN ASSISTANT

## 2025-05-01 PROCEDURE — 99284 EMERGENCY DEPT VISIT MOD MDM: CPT

## 2025-05-01 PROCEDURE — 97530 THERAPEUTIC ACTIVITIES: CPT

## 2025-05-01 PROCEDURE — 96372 THER/PROPH/DIAG INJ SC/IM: CPT

## 2025-05-01 PROCEDURE — 97140 MANUAL THERAPY 1/> REGIONS: CPT

## 2025-05-01 PROCEDURE — 97110 THERAPEUTIC EXERCISES: CPT

## 2025-05-01 RX ORDER — METHYLPREDNISOLONE 4 MG/1
TABLET ORAL
Qty: 1 KIT | Refills: 0 | Status: SHIPPED | OUTPATIENT
Start: 2025-05-01 | End: 2025-05-07

## 2025-05-01 RX ORDER — OXYCODONE HYDROCHLORIDE 5 MG/1
5 TABLET ORAL EVERY 8 HOURS PRN
Qty: 9 TABLET | Refills: 0 | Status: SHIPPED | OUTPATIENT
Start: 2025-05-01 | End: 2025-05-04

## 2025-05-01 RX ORDER — MORPHINE SULFATE 2 MG/ML
2 INJECTION, SOLUTION INTRAMUSCULAR; INTRAVENOUS ONCE
Status: COMPLETED | OUTPATIENT
Start: 2025-05-01 | End: 2025-05-01

## 2025-05-01 RX ADMIN — MORPHINE SULFATE 2 MG: 2 INJECTION, SOLUTION INTRAMUSCULAR; INTRAVENOUS at 22:02

## 2025-05-01 ASSESSMENT — LIFESTYLE VARIABLES
HOW MANY STANDARD DRINKS CONTAINING ALCOHOL DO YOU HAVE ON A TYPICAL DAY: PATIENT DECLINED
HOW OFTEN DO YOU HAVE A DRINK CONTAINING ALCOHOL: PATIENT DECLINED

## 2025-05-01 ASSESSMENT — PAIN DESCRIPTION - LOCATION: LOCATION: ANKLE;FOOT

## 2025-05-01 ASSESSMENT — PAIN DESCRIPTION - ORIENTATION: ORIENTATION: LEFT

## 2025-05-01 ASSESSMENT — PAIN DESCRIPTION - DESCRIPTORS: DESCRIPTORS: SHARP

## 2025-05-01 ASSESSMENT — PAIN SCALES - GENERAL: PAINLEVEL_OUTOF10: 9

## 2025-05-01 NOTE — PROGRESS NOTES
she has diastasis recti. She is also wearing a waist .     Progress Note Counter: 4     Initial Pain Level::      3/10   Post Session Pain Level:        2/10  Medications Last Reviewed:  5/1/2025  Updated Objective Findings:  none  Treatment   ____________________________________________________________________________  Neuromuscular Re-education: Exercise/activities per grid below in red to improve balance, coordination, posture, and proprioception. Includes pain neuroscience education and verbal and manual cues for muscle activation and postural control and correction.     Therapeutic Activities: Therapeutic activities per grid below in blue including dynamic activities and education to improve functional performance and improve mobility to improve mobility, strength, and coordination.     Therapeutic Exercise: Exercises per grid below to improve mobility, strength, and coordination. Required minimal verbal, manual, and tactile cues to promote proper body alignment, posture, and body mechanics. Progress resistance, repetitions, and complexity of movement as indicated.    Tx area: L ankle   4/24/25 5/1/25   Activity/Exercise     NuStep (supervised warm up while discussing subjecting pt report,  functional progress, and current symptoms with a gradual increase in intensity if indicated) 7 min  7 min    HEP demonstration and review yes yes   Patient Education yes yes   Ankle band ex     Seated calf stretch with strap     Weight shifting in shoe     Gait with regular shoe     More glute med strengthening     Dead bugs  2 x 10    Stairs (andrae step downs)     Tilt board DF/PF, inv/ev     BAPS board PF/DF 30x    Seated HR  10x    Seated HR with self resistance 2 x 10     Standing PF/DF partial ROM on board 3 x 10     Part practice for gait 20x ea  Step throughs B;  Push off B    Posterior tib band ex  3 x 10 YTB   Ankle 4-way with band Next time--> 10x ea YTB   Toe curls  2 x 10 RTB   Dead bugs     HEP: seated HR,

## 2025-05-02 ENCOUNTER — CLINICAL DOCUMENTATION (OUTPATIENT)
Dept: ORTHOPEDIC SURGERY | Age: 30
End: 2025-05-02

## 2025-05-02 ENCOUNTER — TELEPHONE (OUTPATIENT)
Dept: ORTHOPEDIC SURGERY | Age: 30
End: 2025-05-02

## 2025-05-02 NOTE — ED PROVIDER NOTES
Emergency Department Provider Note       [facility] EMERGENCY DEPT   PCP: Ann Reich DO   Age: 29 y.o.   Sex: female     DISPOSITION Decision To Discharge 05/01/2025 10:15:54 PM    ICD-10-CM    1. Rupture of left Achilles tendon, initial encounter  S86.012A oxyCODONE (ROXICODONE) 5 MG immediate release tablet     [facility]          Medical Decision Making     In summary, Sarah Reilly is a 29 y.o. female who presented to the emergency department today with left ankle pain. Ddx include, but are not limited to, ankle sprain, ankle dislocation, ankle syndesmosis injury, fracture, fracture, infection, plantar fasciitis, septic joint, Achilles rupture, etc.     On exam, the left ankle swollen, tender without negative Zuleta test. On exam there were no significant signs of infection or erythema without any viral symptoms to suggest possible infection versus septic joint.  Based on exam and symptomology low concern for plantar fasciitis as there is no significant tenderness over the plantar fascia.  Based on the squeeze test and mechanism of injury low concern for ankle syndesmosis injury.  Based on the [place] ankle rules x-ray was not obtained today.  She was only tender over the posterior Achilles the left ankle.  Based on mechanism actually, recent Achilles repair I feel Achilles tear versus rupture is most likely.  She was referred back to her orthopedic that she is currently seeing.    Sarah Reilly well appearing and currently safe for discharge. In my clinical judgement, her presentation is most consistent with Achilles tear versus rupture. She was discharged home with discharge instructions and return precautions. She was instructed follow up with PMD for further follow up if needed.        1 acute, uncomplicated illness or injury.  Prescription drug management performed.  Parental controlled substances given in the ED.  The following clinical decision tools were used in      Past History and Complexity:     Past Medical History:   Diagnosis Date    Anemia 5/31/2024    Asthma     outgrew in childhood    Genital herpes         Past Surgical History:   Procedure Laterality Date    ACHILLES TENDON SURGERY Left 2/12/2025    Left Achilles Repair with wound debridement performed by Jonas Mc MD at [facility]    ANTERIOR CRUCIATE LIGAMENT REPAIR      OTHER SURGICAL HISTORY  2007    salivary gland excision    WISDOM TOOTH EXTRACTION          Social History     Socioeconomic History    Marital status: Single   Tobacco Use    Smoking status: Never    Smokeless tobacco: Never   Vaping Use    Vaping status: Never Used   Substance and Sexual Activity    Alcohol use: No    Drug use: No    Sexual activity: Yes     Partners: Male     Birth control/protection: Condom     Social Drivers of Health     Food Insecurity: No Food Insecurity (8/18/2024)    Hunger Vital Sign     Worried About Running Out of Food in the Last Year: Never true     Ran Out of Food in the Last Year: Never true   Transportation Needs: No Transportation Needs (8/18/2024)    PRAPARE - Transportation     Lack of Transportation (Medical): No     Lack of Transportation (Non-Medical): No   Social Connections: Unknown (3/19/2021)    Received from [facility]    Social Connections     Frequency of Communication with Friends and Family: Not asked     Frequency of Social Gatherings with Friends and Family: Not asked   Intimate Partner Violence: Unknown (3/19/2021)    Received from [facility]    Intimate Partner Violence     Fear of Current or Ex-Partner: Not asked     Emotionally Abused: Not asked     Physically Abused: Not asked     Sexually Abused: Not asked   Housing Stability: Low Risk  (8/18/2024)    Housing Stability Vital Sign     Unable to Pay for Housing in the Last Year: No     Number of Times Moved in the Last Year: 1     Homeless in the Last Year: No        Discharge Medication List as of

## 2025-05-02 NOTE — DISCHARGE INSTRUCTIONS
Thank you for choosing to trust us here at [facility] ED with your health today. It was my pleasure to care for you. Please continue to take care of yourself and we hope you recover quickly. If you get any communication regarding a survey about your experience in the ED today, I encourage to you to fill this out. We are always striving and looking for ways to improve our practice and expertise so that we can continue to help our patients to the best of our ability. Please see below for some basic discharge instructions along with things to watch out for, which should prompt a return to the Emergency Department for re-evaluation:     General Return Precautions  Overall today, I did not find any life-threatening causes for your symptoms. However, this does not mean that something serious could not develop. If you experience any new or worsening symptoms, it is important that you come back for further evaluation. Not returning for re-evaluation could lead to severe complications, permanent impairment, and/or death.     Overall today I did not feel like you had any bony abnormalities to suggest needing an x-ray.  I do for however given your past history and recent history of Achilles rupture with current physical exam findings, pain you most likely either partially tore or completely tore your Achilles.  I recommend you follow-up with Pat Park, your current PA that you are currently seeing for this issue.  I have also sent a referral on your behalf for further follow-up.    If you are experiencing symptoms of a heart attack, which include but are not limited to chest pain, pressure, that radiates to the neck, arms, back, shortness of breath especially with normal exertion, burping or heartburn please call 911 and return for evaluation. Always be aware of possible stroke symptoms which can be easily remembered by BE FAST Balance (trouble standing/walking), Eyes (vision changes), Face (one sided facial drooping), Arm

## 2025-05-02 NOTE — PROGRESS NOTES
Followed up with MLS regarding pt's reinjury. MLS advises boot, NWDAWNA and see pt on Monday with KF.

## 2025-05-02 NOTE — ED TRIAGE NOTES
Patient arrives via EMS from home. Reports she stepped wrong and hurt her left ankle. States previous achilles injury.

## 2025-05-02 NOTE — TELEPHONE ENCOUNTER
NEW ED referral  Stepped wrong and    Rupture of left Achilles tendon,   Has appt 5/22/25 but please review and advise, should she be seen earlier??  Thank you

## 2025-05-02 NOTE — TELEPHONE ENCOUNTER
Called and spoke with pt. Pain and swelling have decreased since ER visit. Advised pt to wear boot and WB status changed to NWB until MLS or KF advises otherwise. Will speak with MLS or KF for further instruction.

## 2025-05-05 ENCOUNTER — OFFICE VISIT (OUTPATIENT)
Dept: ORTHOPEDIC SURGERY | Age: 30
End: 2025-05-05

## 2025-05-05 DIAGNOSIS — S86.012A ACHILLES TENDON TEAR, LEFT, INITIAL ENCOUNTER: ICD-10-CM

## 2025-05-05 DIAGNOSIS — G89.18 POST-OP PAIN: Primary | ICD-10-CM

## 2025-05-05 PROCEDURE — 99024 POSTOP FOLLOW-UP VISIT: CPT | Performed by: PHYSICIAN ASSISTANT

## 2025-05-05 NOTE — PROGRESS NOTES
Name: Sarah Reilly  YOB: 1995  Gender: female  MRN: [id number]      Procedure: Left Achilles Repair with wound debridement - Left    Surgery Date: 2/12/2025    Subjective: Denies fevers chills or infection.  Denies any signs of spreading erythema.  Has been compliant with nonweightbearing.  Presents today using a iwalkfor ambulation.    4/10/2025-patient states she is doing well.  She denies any significant pain.  She does note that she has been extremely hesitant to put weight through her foot.  She has been mainly just touchdown weightbearing.    5/5/2025-patient presents with increased pain to the Achilles area.  On 5/1/2025, patient heard her baby crying and was in regular shoes with both heel lifts and planted quickly to go get her baby and felt a pop in the back of her leg and had immediate pain.  She states that the pain was severe for approximately 30 minutes.  She notes increased swelling around the area.  She went to the ED and called our office.  She has been in the boot and 2 heel lifts and nonweightbearing since that time.    ROS: Patient Denies fever/chills, headache, visual changes, chest pain, shortness of breath, and nausea/vomiting/diarrhea     Exam:   Gen: AAOx3 NAD  Resp: CTAB - no wheezing  Card: RRR  Eyes: sclera non icteric    Wound healed.  No signs of dehiscence or infection.  No signs of erythema or drainage.  Neurovascularly intact of the foot.  With toes warm and well-perfused.  Positive Zuleta's.  The resting tension of the operative ankle/foot is about 10deg more plantar flexed than the contralateral side.  Palpation of the Achilles tendon shows gapping proximal to the incision.  No signs of sural nerve damage.  FHL, FDL, anterior tib, posterior tib intact. +silt to s/s/sp/dp/t.     Imaging:   X-Ray LEFT Ankle 3 vw (AP/Lateral/Oblique) for ankle pain   Findings: No signs of displacement of the mortise.  No signs of acute injury or fracture to the talus, mortise,

## 2025-05-06 ENCOUNTER — TELEPHONE (OUTPATIENT)
Dept: ORTHOPEDIC SURGERY | Age: 30
End: 2025-05-06

## 2025-05-06 ENCOUNTER — HOSPITAL ENCOUNTER (OUTPATIENT)
Dept: PHYSICAL THERAPY | Age: 30
Setting detail: RECURRING SERIES
End: 2025-05-06
Payer: MEDICAID

## 2025-05-06 DIAGNOSIS — S86.012A ACHILLES TENDON TEAR, LEFT, INITIAL ENCOUNTER: Primary | ICD-10-CM

## 2025-05-06 NOTE — TELEPHONE ENCOUNTER
She did not go to PT today. She wants to know if you have reviewed her MRI and if she needs to go. She feels like she has too much pain to do PT. Please let her know.

## 2025-05-07 DIAGNOSIS — M76.62 LEFT ACHILLES TENDINITIS: ICD-10-CM

## 2025-05-07 RX ORDER — HYDROXYZINE HYDROCHLORIDE 10 MG/1
1-2 TABLET, FILM COATED ORAL AS NEEDED
COMMUNITY
Start: 2025-04-30

## 2025-05-07 RX ORDER — CYCLOBENZAPRINE HCL 5 MG
5 TABLET ORAL NIGHTLY
COMMUNITY
Start: 2025-05-01

## 2025-05-07 RX ORDER — ACETAMINOPHEN 500 MG
1000 TABLET ORAL EVERY 6 HOURS PRN
COMMUNITY

## 2025-05-07 NOTE — PERIOP NOTE
Patient verified name and .  Order for consent NOT found in EHR at time of PAT visit. Unable to verify case posting against order; surgery verified by patient.    Type 1B surgery, phone assessment complete.  Orders not received.  Labs per surgeon: none ordered  Labs per anesthesia protocol: Hgb s/h for DOS    Patient answered medical/surgical history questions at their best of ability. All prior to admission medications documented in EPIC.    Patient instructed to continue taking all prescription medications up to the day of surgery but to take only the following medications the day of surgery according to anesthesia guidelines with a small sip of water: hydroxyzine if needed, sertraline, acyclovir if needed.   Also, patient is requested to take 2 Tylenol in the morning and then again before bed on the day before surgery. Regular or extra strength may be used.       Patient informed that all vitamins and supplements should be held 7 days prior to surgery and NSAIDS 5 days prior to surgery. Prescription meds to hold:  none    Patient instructed on the following:    > Arrive at [facility]PC Entrance, time of arrival to be called the day before by 1700  > No food after midnight, patient may drink clear liquids up until 2 hours prior to arrival. No gum, candy, mints.   > Responsible adult must drive patient to the hospital, stay during surgery, and patient will need supervision 24 hours after anesthesia  > Use non moisturizing soap in shower the night before surgery and on the morning of surgery  > All piercings must be removed prior to arrival.    > Leave all valuables (money and jewelry) at home but bring insurance card and ID on DOS.   > You may be required to pay a deductible or co-pay on the day of your procedure. You can pre-pay by calling [phone] if your surgery is at the [facility] or [phone] if your surgery is at the [facility] [place] [facility].  > Do not wear make-up, nail polish, lotions, cologne, perfumes, powders,

## 2025-05-07 NOTE — TELEPHONE ENCOUNTER
Patient spoke with her.  I personally viewed the MRI images and the radiology report.  She explained that she got up from a seated position and pushed off, felt a large pop, felt a tear and is not having Pain and spasm    I explained that she ruptured her Achilles tendon and that there is a large gap.  I explained treating this nonoperatively however with this large I do have concern about improper healing.  We then discussed operative intervention.  I discussed primary repair, VY advancement, Achilles turndown, allograft repair and FHL tendon transfer.  I explained how these can be done and explained the recovery time.  I explained this will be a repeat recovery time with 3 weeks of nonweightbearing with slow transition.  The same risk and benefits is her for surgery however she does have a higher complication rate due to this being revision surgery.    After discussion with her show to proceed with surgery    We discussed multiple surgical options.  We discussed surgery to be attempted primary repair with potential allograft potential FHL tendon transfer.   I discussed with this patient  the risk associated with the outlined surgery.  These risk include infection, delayed wound healing, hardware failure, painful hardware, recurring wounds, recurring pain, damage to surrounding structures such as nerves, vessels, tendons, ligaments, and joints.  I discussed how no surgery is perfect and this surgery may not be successful.  There is also risk of anesthesia such as nerve damage from local anesthetic, damage to the airway or mouth structures, respiratory distress, cardiac disease exacerbation, potential arrhythmias, and even death.  The patient verbalized understanding of each of these risk  The patient was thoroughly informed regarding the Treatment Plan.  Through shared decision making the patient elected to proceed with surgery and consented to the procedure.      MRI Result (most recent):  MRI ANKLE LEFT WO

## 2025-05-13 ENCOUNTER — ANESTHESIA EVENT (OUTPATIENT)
Dept: SURGERY | Age: 30
End: 2025-05-13
Payer: MEDICAID

## 2025-05-13 DIAGNOSIS — G89.18 POST-OP PAIN: Primary | ICD-10-CM

## 2025-05-13 RX ORDER — ONDANSETRON 4 MG/1
4 TABLET, FILM COATED ORAL DAILY PRN
Qty: 30 TABLET | Refills: 0 | Status: SHIPPED | OUTPATIENT
Start: 2025-05-13

## 2025-05-13 RX ORDER — DOCUSATE SODIUM 100 MG/1
100 CAPSULE, LIQUID FILLED ORAL DAILY PRN
Qty: 30 CAPSULE | Refills: 0 | Status: SHIPPED | OUTPATIENT
Start: 2025-05-13

## 2025-05-13 RX ORDER — ASPIRIN 81 MG/1
81 TABLET ORAL 2 TIMES DAILY
Qty: 42 TABLET | Refills: 0 | Status: SHIPPED | OUTPATIENT
Start: 2025-05-13 | End: 2025-06-03

## 2025-05-13 RX ORDER — OXYCODONE HYDROCHLORIDE 5 MG/1
5 TABLET ORAL EVERY 6 HOURS PRN
Qty: 20 TABLET | Refills: 0 | Status: SHIPPED | OUTPATIENT
Start: 2025-05-13 | End: 2025-05-18

## 2025-05-13 NOTE — PERIOP NOTE
Preop department called to notify patient of arrival time for scheduled procedure. Instructions given to   - Arrive at [facility]PC Entrance [address].  - Nothing to eat after midnight unless otherwise indicated. No gum, mints, or ice chips. You may have clear liquids two hours prior to arrival to the hospital.   - Have a responsible adult to drive patient to the hospital, stay during surgery, and patient will need supervision 24 hours after anesthesia.   - Use antibacterial soap in shower the night before surgery and on the morning of surgery.       Was patient contacted: patient called back  Voicemail left:   Numbers contacted: [phone]   Arrival time: 0930  Time to stop clear liquids: 0730

## 2025-05-13 NOTE — PERIOP NOTE
Preop department called to notify patient of arrival time for scheduled procedure. Instructions given to   - Arrive at OPC Entrance [address].  - Nothing to eat after midnight unless otherwise indicated. No gum, mints, or ice chips. You may have clear liquids two hours prior to arrival to the hospital.   - Have a responsible adult to drive patient to the hospital, stay during surgery, and patient will need supervision 24 hours after anesthesia.   - Use antibacterial soap in shower the night before surgery and on the morning of surgery.       Was patient contacted: no  Voicemail left: yes  Numbers contacted: [phone]   Arrival time: 0930  Time to stop clear liquids: 0730

## 2025-05-14 ENCOUNTER — HOSPITAL ENCOUNTER (OUTPATIENT)
Age: 30
Setting detail: OUTPATIENT SURGERY
Discharge: HOME | End: 2025-05-14
Attending: ORTHOPAEDIC SURGERY | Admitting: ORTHOPAEDIC SURGERY
Payer: MEDICAID

## 2025-05-14 ENCOUNTER — ANESTHESIA (OUTPATIENT)
Dept: SURGERY | Age: 30
End: 2025-05-14
Payer: MEDICAID

## 2025-05-14 ENCOUNTER — APPOINTMENT (OUTPATIENT)
Dept: GENERAL RADIOLOGY | Age: 30
End: 2025-05-14
Attending: ORTHOPAEDIC SURGERY
Payer: MEDICAID

## 2025-05-14 VITALS
HEART RATE: 74 BPM | DIASTOLIC BLOOD PRESSURE: 82 MMHG | SYSTOLIC BLOOD PRESSURE: 132 MMHG | RESPIRATION RATE: 16 BRPM | WEIGHT: 166 LBS | HEIGHT: 66 IN | BODY MASS INDEX: 26.68 KG/M2 | OXYGEN SATURATION: 97 % | TEMPERATURE: 97.5 F

## 2025-05-14 LAB
HCG UR QL: NEGATIVE
HGB BLD-MCNC: 11.8 G/DL (ref 11.7–15.4)

## 2025-05-14 PROCEDURE — 7100000010 HC PHASE II RECOVERY - FIRST 15 MIN: Performed by: ORTHOPAEDIC SURGERY

## 2025-05-14 PROCEDURE — 2500000003 HC RX 250 WO HCPCS: Performed by: STUDENT IN AN ORGANIZED HEALTH CARE EDUCATION/TRAINING PROGRAM

## 2025-05-14 PROCEDURE — 2580000003 HC RX 258: Performed by: ANESTHESIOLOGY

## 2025-05-14 PROCEDURE — 3700000001 HC ADD 15 MINUTES (ANESTHESIA): Performed by: ORTHOPAEDIC SURGERY

## 2025-05-14 PROCEDURE — 81025 URINE PREGNANCY TEST: CPT

## 2025-05-14 PROCEDURE — 3600000004 HC SURGERY LEVEL 4 BASE: Performed by: ORTHOPAEDIC SURGERY

## 2025-05-14 PROCEDURE — 7100000001 HC PACU RECOVERY - ADDTL 15 MIN: Performed by: ORTHOPAEDIC SURGERY

## 2025-05-14 PROCEDURE — C1762 CONN TISS, HUMAN(INC FASCIA): HCPCS | Performed by: ORTHOPAEDIC SURGERY

## 2025-05-14 PROCEDURE — 2720000010 HC SURG SUPPLY STERILE: Performed by: ORTHOPAEDIC SURGERY

## 2025-05-14 PROCEDURE — 2709999900 HC NON-CHARGEABLE SUPPLY: Performed by: ORTHOPAEDIC SURGERY

## 2025-05-14 PROCEDURE — 3700000000 HC ANESTHESIA ATTENDED CARE: Performed by: ORTHOPAEDIC SURGERY

## 2025-05-14 PROCEDURE — 6360000002 HC RX W HCPCS: Performed by: PHYSICIAN ASSISTANT

## 2025-05-14 PROCEDURE — 7100000000 HC PACU RECOVERY - FIRST 15 MIN: Performed by: ORTHOPAEDIC SURGERY

## 2025-05-14 PROCEDURE — 3600000014 HC SURGERY LEVEL 4 ADDTL 15MIN: Performed by: ORTHOPAEDIC SURGERY

## 2025-05-14 PROCEDURE — 85018 HEMOGLOBIN: CPT

## 2025-05-14 PROCEDURE — C1713 ANCHOR/SCREW BN/BN,TIS/BN: HCPCS | Performed by: ORTHOPAEDIC SURGERY

## 2025-05-14 PROCEDURE — 6360000002 HC RX W HCPCS: Performed by: ANESTHESIOLOGY

## 2025-05-14 PROCEDURE — 6370000000 HC RX 637 (ALT 250 FOR IP): Performed by: ANESTHESIOLOGY

## 2025-05-14 PROCEDURE — 6360000002 HC RX W HCPCS: Performed by: STUDENT IN AN ORGANIZED HEALTH CARE EDUCATION/TRAINING PROGRAM

## 2025-05-14 DEVICE — GRAFT BNE L230MM DIA4MM GRACILIS TEND HAMSTRING FRZN FOR: Type: IMPLANTABLE DEVICE | Site: ACHILLES TENDON | Status: FUNCTIONAL

## 2025-05-14 DEVICE — SCREW INTFR L15MM DIA5.5MM BIOCOMPOSITE BIO-TENODESIS: Type: IMPLANTABLE DEVICE | Site: ACHILLES TENDON | Status: FUNCTIONAL

## 2025-05-14 RX ORDER — OXYCODONE HYDROCHLORIDE 5 MG/1
5 TABLET ORAL PRN
Status: COMPLETED | OUTPATIENT
Start: 2025-05-14 | End: 2025-05-14

## 2025-05-14 RX ORDER — PROPOFOL 10 MG/ML
INJECTION, EMULSION INTRAVENOUS
Status: DISCONTINUED | OUTPATIENT
Start: 2025-05-14 | End: 2025-05-14 | Stop reason: SDUPTHER

## 2025-05-14 RX ORDER — ROCURONIUM BROMIDE 10 MG/ML
INJECTION, SOLUTION INTRAVENOUS
Status: DISCONTINUED | OUTPATIENT
Start: 2025-05-14 | End: 2025-05-14 | Stop reason: SDUPTHER

## 2025-05-14 RX ORDER — PROCHLORPERAZINE EDISYLATE 5 MG/ML
5 INJECTION INTRAMUSCULAR; INTRAVENOUS
Status: DISCONTINUED | OUTPATIENT
Start: 2025-05-14 | End: 2025-05-14 | Stop reason: HOSPADM

## 2025-05-14 RX ORDER — SODIUM CHLORIDE 9 MG/ML
INJECTION, SOLUTION INTRAVENOUS PRN
Status: DISCONTINUED | OUTPATIENT
Start: 2025-05-14 | End: 2025-05-14 | Stop reason: HOSPADM

## 2025-05-14 RX ORDER — LIDOCAINE HYDROCHLORIDE 20 MG/ML
INJECTION, SOLUTION EPIDURAL; INFILTRATION; INTRACAUDAL; PERINEURAL
Status: DISCONTINUED | OUTPATIENT
Start: 2025-05-14 | End: 2025-05-14 | Stop reason: SDUPTHER

## 2025-05-14 RX ORDER — NEOSTIGMINE METHYLSULFATE 1 MG/ML
INJECTION, SOLUTION INTRAVENOUS
Status: DISCONTINUED | OUTPATIENT
Start: 2025-05-14 | End: 2025-05-14 | Stop reason: SDUPTHER

## 2025-05-14 RX ORDER — DIPHENHYDRAMINE HYDROCHLORIDE 50 MG/ML
12.5 INJECTION, SOLUTION INTRAMUSCULAR; INTRAVENOUS
Status: DISCONTINUED | OUTPATIENT
Start: 2025-05-14 | End: 2025-05-14 | Stop reason: HOSPADM

## 2025-05-14 RX ORDER — DEXMEDETOMIDINE HYDROCHLORIDE 100 UG/ML
INJECTION, SOLUTION INTRAVENOUS
Status: DISCONTINUED | OUTPATIENT
Start: 2025-05-14 | End: 2025-05-14 | Stop reason: SDUPTHER

## 2025-05-14 RX ORDER — NALOXONE HYDROCHLORIDE 0.4 MG/ML
INJECTION, SOLUTION INTRAMUSCULAR; INTRAVENOUS; SUBCUTANEOUS PRN
Status: DISCONTINUED | OUTPATIENT
Start: 2025-05-14 | End: 2025-05-14 | Stop reason: HOSPADM

## 2025-05-14 RX ORDER — FENTANYL CITRATE 50 UG/ML
100 INJECTION, SOLUTION INTRAMUSCULAR; INTRAVENOUS
Status: DISCONTINUED | OUTPATIENT
Start: 2025-05-14 | End: 2025-05-14 | Stop reason: HOSPADM

## 2025-05-14 RX ORDER — ONDANSETRON 2 MG/ML
4 INJECTION INTRAMUSCULAR; INTRAVENOUS
Status: DISCONTINUED | OUTPATIENT
Start: 2025-05-14 | End: 2025-05-14 | Stop reason: HOSPADM

## 2025-05-14 RX ORDER — DEXAMETHASONE SODIUM PHOSPHATE 10 MG/ML
INJECTION, SOLUTION INTRA-ARTICULAR; INTRALESIONAL; INTRAMUSCULAR; INTRAVENOUS; SOFT TISSUE
Status: DISCONTINUED | OUTPATIENT
Start: 2025-05-14 | End: 2025-05-14 | Stop reason: SDUPTHER

## 2025-05-14 RX ORDER — OXYCODONE HYDROCHLORIDE 5 MG/1
10 TABLET ORAL PRN
Status: COMPLETED | OUTPATIENT
Start: 2025-05-14 | End: 2025-05-14

## 2025-05-14 RX ORDER — SODIUM CHLORIDE 0.9 % (FLUSH) 0.9 %
5-40 SYRINGE (ML) INJECTION EVERY 12 HOURS SCHEDULED
Status: DISCONTINUED | OUTPATIENT
Start: 2025-05-14 | End: 2025-05-14 | Stop reason: HOSPADM

## 2025-05-14 RX ORDER — GLYCOPYRROLATE 0.2 MG/ML
INJECTION INTRAMUSCULAR; INTRAVENOUS
Status: DISCONTINUED | OUTPATIENT
Start: 2025-05-14 | End: 2025-05-14 | Stop reason: SDUPTHER

## 2025-05-14 RX ORDER — FENTANYL CITRATE 50 UG/ML
INJECTION, SOLUTION INTRAMUSCULAR; INTRAVENOUS
Status: DISCONTINUED | OUTPATIENT
Start: 2025-05-14 | End: 2025-05-14 | Stop reason: SDUPTHER

## 2025-05-14 RX ORDER — ONDANSETRON 2 MG/ML
INJECTION INTRAMUSCULAR; INTRAVENOUS
Status: DISCONTINUED | OUTPATIENT
Start: 2025-05-14 | End: 2025-05-14 | Stop reason: SDUPTHER

## 2025-05-14 RX ORDER — SODIUM CHLORIDE 0.9 % (FLUSH) 0.9 %
5-40 SYRINGE (ML) INJECTION PRN
Status: DISCONTINUED | OUTPATIENT
Start: 2025-05-14 | End: 2025-05-14 | Stop reason: HOSPADM

## 2025-05-14 RX ORDER — MIDAZOLAM HYDROCHLORIDE 2 MG/2ML
2 INJECTION, SOLUTION INTRAMUSCULAR; INTRAVENOUS
Status: DISCONTINUED | OUTPATIENT
Start: 2025-05-14 | End: 2025-05-14 | Stop reason: HOSPADM

## 2025-05-14 RX ORDER — SODIUM CHLORIDE, SODIUM LACTATE, POTASSIUM CHLORIDE, CALCIUM CHLORIDE 600; 310; 30; 20 MG/100ML; MG/100ML; MG/100ML; MG/100ML
INJECTION, SOLUTION INTRAVENOUS CONTINUOUS
Status: DISCONTINUED | OUTPATIENT
Start: 2025-05-14 | End: 2025-05-14 | Stop reason: HOSPADM

## 2025-05-14 RX ORDER — LIDOCAINE HYDROCHLORIDE 10 MG/ML
1 INJECTION, SOLUTION INFILTRATION; PERINEURAL
Status: DISCONTINUED | OUTPATIENT
Start: 2025-05-14 | End: 2025-05-14 | Stop reason: HOSPADM

## 2025-05-14 RX ADMIN — PROPOFOL 200 MG: 10 INJECTION, EMULSION INTRAVENOUS at 11:45

## 2025-05-14 RX ADMIN — FENTANYL CITRATE 50 MCG: 50 INJECTION, SOLUTION INTRAMUSCULAR; INTRAVENOUS at 11:44

## 2025-05-14 RX ADMIN — ONDANSETRON 4 MG: 2 INJECTION INTRAMUSCULAR; INTRAVENOUS at 12:00

## 2025-05-14 RX ADMIN — OXYCODONE HYDROCHLORIDE 5 MG: 5 TABLET ORAL at 14:21

## 2025-05-14 RX ADMIN — GLYCOPYRROLATE 0.4 MG: 0.2 INJECTION INTRAMUSCULAR; INTRAVENOUS at 12:59

## 2025-05-14 RX ADMIN — Medication 2000 MG: at 11:56

## 2025-05-14 RX ADMIN — HYDROMORPHONE HYDROCHLORIDE 0.5 MG: 1 INJECTION, SOLUTION INTRAMUSCULAR; INTRAVENOUS; SUBCUTANEOUS at 13:35

## 2025-05-14 RX ADMIN — SODIUM CHLORIDE, SODIUM LACTATE, POTASSIUM CHLORIDE, AND CALCIUM CHLORIDE: .6; .31; .03; .02 INJECTION, SOLUTION INTRAVENOUS at 10:10

## 2025-05-14 RX ADMIN — Medication 3 MG: at 12:59

## 2025-05-14 RX ADMIN — PROPOFOL 20 MG: 10 INJECTION, EMULSION INTRAVENOUS at 12:09

## 2025-05-14 RX ADMIN — LIDOCAINE HYDROCHLORIDE 100 MG: 20 INJECTION, SOLUTION EPIDURAL; INFILTRATION; INTRACAUDAL; PERINEURAL at 11:45

## 2025-05-14 RX ADMIN — PROPOFOL 100 MG: 10 INJECTION, EMULSION INTRAVENOUS at 12:18

## 2025-05-14 RX ADMIN — PROPOFOL 30 MG: 10 INJECTION, EMULSION INTRAVENOUS at 13:04

## 2025-05-14 RX ADMIN — PROPOFOL 30 MG: 10 INJECTION, EMULSION INTRAVENOUS at 12:59

## 2025-05-14 RX ADMIN — FENTANYL CITRATE 25 MCG: 50 INJECTION, SOLUTION INTRAMUSCULAR; INTRAVENOUS at 13:26

## 2025-05-14 RX ADMIN — DEXAMETHASONE SODIUM PHOSPHATE 10 MG: 10 INJECTION INTRAMUSCULAR; INTRAVENOUS at 12:00

## 2025-05-14 RX ADMIN — DEXMEDETOMIDINE 8 MCG: 100 INJECTION, SOLUTION, CONCENTRATE INTRAVENOUS at 13:05

## 2025-05-14 RX ADMIN — PROPOFOL 80 MG: 10 INJECTION, EMULSION INTRAVENOUS at 11:52

## 2025-05-14 RX ADMIN — FENTANYL CITRATE 50 MCG: 50 INJECTION, SOLUTION INTRAMUSCULAR; INTRAVENOUS at 13:06

## 2025-05-14 RX ADMIN — HYDROMORPHONE HYDROCHLORIDE 0.5 MG: 1 INJECTION, SOLUTION INTRAMUSCULAR; INTRAVENOUS; SUBCUTANEOUS at 13:44

## 2025-05-14 RX ADMIN — FENTANYL CITRATE 50 MCG: 50 INJECTION, SOLUTION INTRAMUSCULAR; INTRAVENOUS at 11:45

## 2025-05-14 RX ADMIN — DEXMEDETOMIDINE 12 MCG: 100 INJECTION, SOLUTION, CONCENTRATE INTRAVENOUS at 11:50

## 2025-05-14 RX ADMIN — ROCURONIUM BROMIDE 25 MG: 10 INJECTION, SOLUTION INTRAVENOUS at 11:45

## 2025-05-14 RX ADMIN — FENTANYL CITRATE 25 MCG: 50 INJECTION, SOLUTION INTRAMUSCULAR; INTRAVENOUS at 12:19

## 2025-05-14 ASSESSMENT — PAIN DESCRIPTION - DESCRIPTORS
DESCRIPTORS: ACHING;BURNING
DESCRIPTORS: ACHING;BURNING

## 2025-05-14 ASSESSMENT — PAIN - FUNCTIONAL ASSESSMENT: PAIN_FUNCTIONAL_ASSESSMENT: 0-10

## 2025-05-14 ASSESSMENT — PAIN DESCRIPTION - ORIENTATION
ORIENTATION: LEFT
ORIENTATION: LEFT

## 2025-05-14 ASSESSMENT — PAIN SCALES - GENERAL
PAINLEVEL_OUTOF10: 3
PAINLEVEL_OUTOF10: 4
PAINLEVEL_OUTOF10: 0
PAINLEVEL_OUTOF10: 10
PAINLEVEL_OUTOF10: 10

## 2025-05-14 ASSESSMENT — PAIN DESCRIPTION - LOCATION
LOCATION: FOOT
LOCATION: LEG

## 2025-05-14 NOTE — OP NOTE
Operative Note    Patient:Sarah Reilly  MRN: [id number]    Date Of Surgery: 5/14/2025    Surgeon: Jonas Mc MD    Assistant Surgeon: None    Procedure Performed: left  Secondary Repair of Achilles  Partial Excision Calcaneus   Removal of deep implant    Pre Op Diagnosis:  Achilles tendon rupture    Post Op Diagnosis:   same    Implants:   Implant Name Type Inv. Item Serial No.  Lot No. LRB No. Used Action   GRAFT BNE L230MM DIA4MM GRACILIS TEND HAMSTRING [facility] FOR - [id number]  GRAFT BNE L230MM DIA4MM GRACILIS TEND HAMSTRING FR FOR [id number] [facility] TISSUE [employer]-WD N/A Left 1 Implanted   SCREW INTFR L15MM DIA5.5MM BIOCOM[employer]ITE BIO-TENODESIS - [id number]  SCREW INTFR L15MM DIA5.5MM BIOCOMPOSITE BIO-TENODESIS  [employer]WD [id number] Left 1 Implanted       Anesthesia:  General    Blood Loss:  Less than 100 ml    Tourniquet:  Estimated 0 minutes - thigh tourniquet    Complications:  None    Pre Operative Abx:  Ancef 2g    Specimens/Cultures:  none    Significant Findings:  Ruptured Achilles tendon approximately 6 cm proximal to the insertion with good tendinous fibers.  Unfortunately had retracted on was able to pull it down to its insertion.  We treated this by taking an allograft, weaving it through the tendon stump and then using a Bio-Tenodesis screw to inserted into the calcaneal bone.    Pre Operative Course:  Sarah Reilly is a 29 y.o. female who has a history of a traumatic tendon laceration with resulting Achilles tearing.  It was treated with primary repair but then she had an accident approximately 3 months postoperatively which resulted in repeat rupture.  The decision was made to proceed with surgery to revise fixation    Operation In Detail:  Patient was evaluated in the preoperative area.  The left lower extremity was marked by me.  We had a long discussion about the procedure and postoperative protocols.  The patient was then brought back to the operating room suite and

## 2025-05-14 NOTE — ANESTHESIA POSTPROCEDURE EVALUATION
Department of Anesthesiology  Postprocedure Note    Patient: Sarah Reilly  MRN: [id number]  YOB: 1995  Date of evaluation: 5/14/2025    Procedure Summary       Date: 05/14/25 Room / Location: [facility] OP OR 03 / [facility]    Anesthesia Start: 1138 Anesthesia Stop: 1327    Procedure: LEFT ACHILLES RECONSTRUCTION WITH FHL AND ALLOGRAFT- PRONE (Left: Leg Lower) Diagnosis:       Left Achilles tendinitis      (Left Achilles tendinitis [M76.62])    Surgeons: Jonas Mc MD Responsible Provider: Kana Sofia IV, MD    Anesthesia Type: General ASA Status: 2            Anesthesia Type: General    Juan Manuel Phase I: Juan Manuel Score: 7    Juan Manuel Phase II: Juan Manuel Score: 10    Anesthesia Post Evaluation    Patient location during evaluation: PACU  Patient participation: complete - patient participated  Level of consciousness: awake  Airway patency: patent  Nausea & Vomiting: no nausea and no vomiting  Cardiovascular status: hemodynamically stable  Respiratory status: acceptable, nonlabored ventilation and spontaneous ventilation  Hydration status: euvolemic  Comments: /82   Pulse 74   Temp 97.5 °F (36.4 °C) (Skin)   Resp 16   Ht 1.676 m (5' 6\")   Wt 75.3 kg (166 lb)   LMP 04/19/2025   SpO2 97%   Breastfeeding Yes   BMI 26.79 kg/m²     Multimodal analgesia pain management approach  Pain management: adequate and satisfactory to patient        No notable events documented.

## 2025-05-14 NOTE — H&P
Update History & Physical    The Patient's History and Physical was reviewed   I discussed the surgery and patients medical condition with the patient.  The chart was reviewed with the patient and I examined the patient.    There was no change.  The surgical site was confirmed by the patient and me.    CV: no JVD, normal rate by palpation of radial pulse.  RESP: no stridor, labored respirations, no tachypnea, no wheezing    Plan:  The risk, benefits, expected outcome, and alternative to the recommended procedure have been discussed with the patient.  Patient understands and wants to proceed with the procedure.  I called the patient last week and discussed with her on the phone the Achilles rupture.  She understands the surgery that would be needed to be a FHL transfer versus an allograft.  She consents to an FHL transfer.  She consents to an allograft tendon.  She understands the risk and benefits of surgery.  The risk of infection.  The risk of degenerative changes if she does not have surgery and the weakness that would occur.  I had a long discussion with her on the phone and she wishes to proceed with surgery.    I discussed the risk and benefits.  I discussed chronic pain, weakness, infection, allograft tendon, FHL tendon transfer.  After discussion with her about all the risk and benefits which are very similar to her for surgery.  She excepted the for surgery understood those risks and I told her the risk peritoneally the exact same with a slightly higher risk of infection.  She understands and agrees to surgery.  She consents to primary versus secondary Achilles repair with FHL tendon transfer versus allograft    Electronically signed by Jonas Mc MD on 05/14/25 10:36 AM          Name: Sarah Reilly  YOB: 1995  Gender: female  MRN: [id number]      Procedure: Left Achilles Repair with wound debridement - Left    Surgery Date: 2/12/2025    Subjective: Denies fevers chills or infection.

## 2025-05-14 NOTE — DISCHARGE INSTRUCTIONS
Post Operative Plan:   1- WB status: Non-Weight Bearing   2- Follow Up: 2-3 weeks  3- Immobilization/assistive devices: brace/splint and crutches  4- DVT px: ASA 81mg po BID  5- Pain Medication: Perocet 5/35 1 tab po q6 hr prn pain, disp #30  6 -patient has stitches removed at 3 weeks and then will begin secondary Achilles protocol slow progressing 25% every week          ACTIVITY  As tolerated and as directed by your doctor.   Bathe or shower as directed by your doctor.     DIET  Clear liquids until no nausea or vomiting; then light diet for the first day.  Advance to regular diet on second day, unless your doctor orders otherwise.   If nausea and vomiting continues, call your doctor.     PAIN  Take pain medication as directed by your doctor.   Call your doctor if pain is NOT relieved by medication.   DO NOT take aspirin of blood thinners unless directed by your doctor.     MEDICATION INTERACTION:During your procedure you potentially received a medication or medications which may reduce the effectiveness of oral contraceptives. Please consider other forms of contraception for 1 month following your procedure if you are currently using oral contraceptives as your primary form of birth control. In addition to this, we recommend continuing your oral contraceptive as prescribed, unless otherwise instructed by your physician, during this time      CALL YOUR DOCTOR IF   Excessive bleeding that does not stop after holding pressure over the area  Temperature of 101 degrees F or above  Excessive redness, swelling or bruising, and/ or green or yellow, smelly discharge from incision    After general anesthesia or intravenous sedation, for 24 hours or while taking prescription Narcotics:  Limit your activities  A responsible adult needs to be with you for the next 24 hours  Do not drive and operate hazardous machinery  Do not make important personal or business decisions  Do not drink alcoholic beverages  If you have not

## 2025-05-14 NOTE — ANESTHESIA PRE PROCEDURE
Department of Anesthesiology  Preprocedure Note       Name:  Sarah Reilly   Age:  29 y.o.  :  1995                                          MRN:  [id number]         Date:  2025      Surgeon: Surgeon(s):  Jonas Mc MD    Procedure: Procedure(s):  LEFT ACHILLES RECONSTRUCTION WITH FHL AND ALLOGRAFT- PRONE    Medications prior to admission:   Prior to Admission medications    Medication Sig Start Date End Date Taking? Authorizing Provider   hydrOXYzine HCl (ATARAX) 10 MG tablet 1-2 tablets as needed for Anxiety Takes 2 every morning 25  Yes ProviderAlvaro MD   cyclobenzaprine (FLEXERIL) 5 MG tablet Take 1 tablet by mouth nightly 25  Yes ProviderAlvaro MD   sertraline (ZOLOFT) 100 MG tablet Take 1 tablet by mouth daily   Yes Provider, MD Alvaro   acyclovir (ZOVIRAX) 400 MG tablet Take one tablet by oral route twice daily as needed for genital outbreak 24  Yes Franck Childress MD   Prenatal Multivit-Min-Fe-FA (PRENATAL 1 + IRON PO) Take by mouth Daily   Yes Provider, MD Alvaro   oxyCODONE (ROXICODONE) 5 MG immediate release tablet Take 1 tablet by mouth every 6 hours as needed for Pain for up to 5 days. Intended supply: 5 days. Take lowest dose possible to manage pain Max Daily Amount: 20 mg  Patient not taking: Reported on 2025  Tobias, ZEV Dasilva   ondansetron (ZOFRAN) 4 MG tablet Take 1 tablet by mouth daily as needed for Nausea or Vomiting  Patient not taking: Reported on 2025   Tobias, ZEV Dasilva   docusate sodium (COLACE) 100 MG capsule Take 1 capsule by mouth daily as needed for Constipation 25   Tobias, ZEV Dasilva   aspirin (ASPIRIN 81) 81 MG EC tablet Take 1 tablet by mouth in the morning and at bedtime for 21 days  Patient not taking: Reported on 2025 5/13/25 6/3/25  Tobias, ZEV Dasilva   acetaminophen (TYLENOL) 500 MG tablet Take 2 tablets by mouth every 6 hours as needed for

## 2025-05-14 NOTE — ANESTHESIA PROCEDURE NOTES
Airway  Date/Time: 5/14/2025 11:48 AM  Urgency: elective    Airway not difficult    General Information and Staff    Patient location during procedure: OR  Resident/CRNA: Terwilliger, Chelsea, APRN - CRNA  Performed: resident/CRNA   Performed by: Terwilliger, Chelsea, APRN - CRNA  Authorized by: Kana Sofia IV, MD      Indications and Patient Condition  Indications for airway management: anesthesia  Spontaneous Ventilation: absent  Sedation level: deep  Preoxygenated: yes  Patient position: sniffing  MILS not maintained throughout  Mask difficulty assessment: vent by bag mask    Final Airway Details  Final airway type: endotracheal airway      Successful airway: ETT  Cuffed: yes   Successful intubation technique: direct laryngoscopy  Facilitating devices/methods: intubating stylet  Endotracheal tube insertion site: oral  Blade: Jackie  Blade size: #3  ETT size (mm): 7.0  Cormack-Lehane Classification: grade I - full view of glottis  Placement verified by: chest auscultation and capnometry   Measured from: lips  ETT to lips (cm): 22  Number of attempts at approach: 1  Ventilation between attempts: bag mask    Additional Comments  Atraumatic insertion, lips and teeth as preeval

## 2025-05-20 ENCOUNTER — TELEPHONE (OUTPATIENT)
Dept: ORTHOPEDIC SURGERY | Age: 30
End: 2025-05-20

## 2025-05-20 DIAGNOSIS — G89.18 POST-OP PAIN: Primary | ICD-10-CM

## 2025-05-20 RX ORDER — OXYCODONE HYDROCHLORIDE 5 MG/1
5 TABLET ORAL EVERY 6 HOURS PRN
Qty: 20 TABLET | Refills: 0 | Status: SHIPPED | OUTPATIENT
Start: 2025-05-20 | End: 2025-05-25

## 2025-05-30 ENCOUNTER — TELEPHONE (OUTPATIENT)
Dept: ORTHOPEDIC SURGERY | Age: 30
End: 2025-05-30

## 2025-05-30 DIAGNOSIS — G89.18 POST-OP PAIN: Primary | ICD-10-CM

## 2025-05-30 RX ORDER — OXYCODONE HYDROCHLORIDE 5 MG/1
5 TABLET ORAL EVERY 6 HOURS PRN
Qty: 20 TABLET | Refills: 0 | Status: SHIPPED | OUTPATIENT
Start: 2025-05-30 | End: 2025-06-04

## 2025-05-30 NOTE — TELEPHONE ENCOUNTER
She is requesting a refill on Oxycodone to [facility] on [facility].

## 2025-06-02 ASSESSMENT — PROMIS GLOBAL HEALTH SCALE
SUM OF RESPONSES TO QUESTIONS 2, 4, 5, & 10: 14
IN THE PAST 7 DAYS, HOW WOULD YOU RATE YOUR PAIN ON AVERAGE [ON A SCALE FROM 0 (NO PAIN) TO 10 (WORST IMAGINABLE PAIN)]?: 7
IN THE PAST 7 DAYS, HOW OFTEN HAVE YOU BEEN BOTHERED BY EMOTIONAL PROBLEMS, SUCH AS FEELING ANXIOUS, DEPRESSED, OR IRRITABLE [ON A SCALE FROM 1 (NEVER) TO 5 (ALWAYS)]?: RARELY
TO WHAT EXTENT ARE YOU ABLE TO CARRY OUT YOUR EVERYDAY PHYSICAL ACTIVITIES SUCH AS WALKING, CLIMBING STAIRS, CARRYING GROCERIES, OR MOVING A CHAIR [ON A SCALE OF 1 (NOT AT ALL) TO 5 (COMPLETELY)]?: MODERATELY
IN GENERAL, PLEASE RATE HOW WELL YOU CARRY OUT YOUR USUAL SOCIAL ACTIVITIES (INCLUDES ACTIVITIES AT HOME, AT WORK, AND IN YOUR COMMUNITY, AND RESPONSIBILITIES AS A PARENT, CHILD, SPOUSE, EMPLOYEE, FRIEND, ETC) [ON A SCALE OF 1 (POOR) TO 5 (EXCELLENT)]?: GOOD
IN THE PAST 7 DAYS, HOW WOULD YOU RATE YOUR FATIGUE ON AVERAGE [ON A SCALE FROM 1 (NONE) TO 5 (VERY SEVERE)]?: MODERATE
WHO IS THE PERSON COMPLETING THE PROMIS V1.1 SURVEY?: SELF
HOW IS THE PROMIS V1.1 BEING ADMINISTERED?: ELECTRONIC
SUM OF RESPONSES TO QUESTIONS 3, 6, 7, & 8: 17
IN GENERAL, PLEASE RATE HOW WELL YOU CARRY OUT YOUR USUAL SOCIAL ACTIVITIES (INCLUDES ACTIVITIES AT HOME, AT WORK, AND IN YOUR COMMUNITY, AND RESPONSIBILITIES AS A PARENT, CHILD, SPOUSE, EMPLOYEE, FRIEND, ETC) [ON A SCALE OF 1 (POOR) TO 5 (EXCELLENT)]?: GOOD
IN GENERAL, HOW WOULD YOU RATE YOUR SATISFACTION WITH YOUR SOCIAL ACTIVITIES AND RELATIONSHIPS [ON A SCALE OF 1 (POOR) TO 5 (EXCELLENT)]?: GOOD
IN GENERAL, HOW WOULD YOU RATE YOUR MENTAL HEALTH, INCLUDING YOUR MOOD AND YOUR ABILITY TO THINK [ON A SCALE OF 1 (POOR) TO 5 (EXCELLENT)]?: GOOD
IN GENERAL, HOW WOULD YOU RATE YOUR SATISFACTION WITH YOUR SOCIAL ACTIVITIES AND RELATIONSHIPS [ON A SCALE OF 1 (POOR) TO 5 (EXCELLENT)]?: GOOD
IN GENERAL, WOULD YOU SAY YOUR HEALTH IS...[ON A SCALE OF 1 (POOR) TO 5 (EXCELLENT)]: VERY GOOD
WHO IS THE PERSON COMPLETING THE PROMIS V1.1 SURVEY?: SELF
IN THE PAST 7 DAYS, HOW WOULD YOU RATE YOUR FATIGUE ON AVERAGE [ON A SCALE FROM 1 (NONE) TO 5 (VERY SEVERE)]?: MODERATE
TO WHAT EXTENT ARE YOU ABLE TO CARRY OUT YOUR EVERYDAY PHYSICAL ACTIVITIES SUCH AS WALKING, CLIMBING STAIRS, CARRYING GROCERIES, OR MOVING A CHAIR [ON A SCALE OF 1 (NOT AT ALL) TO 5 (COMPLETELY)]?: MODERATELY
IN GENERAL, HOW WOULD YOU RATE YOUR PHYSICAL HEALTH [ON A SCALE OF 1 (POOR) TO 5 (EXCELLENT)]?: VERY GOOD
IN THE PAST 7 DAYS, HOW OFTEN HAVE YOU BEEN BOTHERED BY EMOTIONAL PROBLEMS, SUCH AS FEELING ANXIOUS, DEPRESSED, OR IRRITABLE [ON A SCALE FROM 1 (NEVER) TO 5 (ALWAYS)]?: RARELY
IN GENERAL, WOULD YOU SAY YOUR QUALITY OF LIFE IS...[ON A SCALE OF 1 (POOR) TO 5 (EXCELLENT)]: VERY GOOD
HOW IS THE PROMIS V1.1 BEING ADMINISTERED?: ELECTRONIC
IN THE PAST 7 DAYS, HOW WOULD YOU RATE YOUR PAIN ON AVERAGE [ON A SCALE FROM 0 (NO PAIN) TO 10 (WORST IMAGINABLE PAIN)]?: 7

## 2025-06-05 ENCOUNTER — OFFICE VISIT (OUTPATIENT)
Dept: ORTHOPEDIC SURGERY | Age: 30
End: 2025-06-05

## 2025-06-05 DIAGNOSIS — G89.18 POST-OP PAIN: Primary | ICD-10-CM

## 2025-06-05 DIAGNOSIS — S86.012A ACHILLES TENDON TEAR, LEFT, INITIAL ENCOUNTER: ICD-10-CM

## 2025-06-05 PROCEDURE — M5007 MISC HEEL LIFT: HCPCS | Performed by: PHYSICIAN ASSISTANT

## 2025-06-05 PROCEDURE — 99024 POSTOP FOLLOW-UP VISIT: CPT | Performed by: PHYSICIAN ASSISTANT

## 2025-06-05 RX ORDER — OXYCODONE HYDROCHLORIDE 5 MG/1
5 TABLET ORAL EVERY 6 HOURS PRN
Qty: 20 TABLET | Refills: 0 | Status: SHIPPED | OUTPATIENT
Start: 2025-06-05 | End: 2025-06-10

## 2025-06-05 NOTE — PROGRESS NOTES
The patient was prescribed a walker boot and ½” heel lift for the patient's left foot. The patient wears a size NA shoe and I fitted them with a M size boot. There was also a pad placed on the Achilles to prevent from rubbing while in the boot. It was also stressed that the patient take the boot/sock off as much as possible to prevent infection and to insure healing. The patient was told to peel the heel lift every 3-5 days. The patient was fitted and instructed on the use of prescribed walker boot. I explained how to fit themselves and that the plastic flexible piece should always be on the front of the boot and secured by the Velcro straps on top. The air bladder in the boot was adjusted according to proper fit and comfort. The patient was instructed to walk heel toe once the patient starts applying pressure. I also explained that they need a heel lift or a higher heeled shoe for the uninvolved LE to help normalize gait and avoid excessive low back stress/strain due to leg length inequality created from walker boot. Patient read and signed documenting they understand and agree to [facility] current DME return policy.Patient read and signed documenting they understand and agree to [facility] current DME return policy.

## 2025-06-05 NOTE — PROGRESS NOTES
Name: Sarah Reilly  YOB: 1995  Gender: female  MRN: [id number]      Procedure: Left Achilles Reconstruction With Fhl And Allograft- Prone - Left    Surgery Date: 5/14/2025    Subjective: Denies fevers chills or infection.  Denies any signs of spreading erythema.  Has been compliant with nonweightbearing.  Presents today using a iwalk for ambulation.    ROS: Patient Denies fever/chills, headache, visual changes, chest pain, shortness of breath, and nausea/vomiting/diarrhea     Exam:   Gen: AAOx3 NAD  Resp: CTAB - no wheezing  Card: RRR  Eyes: sclera non icteric    Wound healing appropriately.  Sutures removed.  No signs of dehiscence or infection.  No signs of erythema or drainage.  Neurovascularly intact of the foot.  With toes warm and well-perfused.  When laying prone and squeezing the calf the foot plantar flexes.  The resting tension of the operative ankle/foot is about 10deg more plantar flexed than the contralateral side.  Palpation of the Achilles tendon shows no gapping or signs of tearing.  No signs of sural nerve damage.  FHL, FDL, anterior tib, posterior tib intact. +silt to s/s/sp/dp/t.     Imaging:   No Imaging taken today      Assessment/Plan:    2 weeks postoperatively the plan is to increase weightbearing slowly.    Patient placed into a cam walker boot with 2 heel lifts.  We will increase weightbearing every week over the next 4 weeks.    The first week is 25% weightbearing in the boot with 2 heel lifts.  The second week is 50% weightbearing.  The third week is 75% weightbearing.  The fourth week will be 100% weightbearing in the cam walker boot with 2 heel lifts.  At this point I will see them back and we will progress to more weightbearing in the boot.  We will also slow transition into a shoe at this point.    Physical therapy will be started at next visit.    I was explicit about not walking without the boot with a heel lift.  I explained no standing, walking, are attempting

## 2025-07-03 ENCOUNTER — OFFICE VISIT (OUTPATIENT)
Dept: ORTHOPEDIC SURGERY | Age: 30
End: 2025-07-03

## 2025-07-03 DIAGNOSIS — G89.18 POST-OP PAIN: Primary | ICD-10-CM

## 2025-07-03 DIAGNOSIS — S86.012A ACHILLES TENDON TEAR, LEFT, INITIAL ENCOUNTER: ICD-10-CM

## 2025-07-03 PROCEDURE — 99024 POSTOP FOLLOW-UP VISIT: CPT | Performed by: PHYSICIAN ASSISTANT

## 2025-07-03 NOTE — PROGRESS NOTES
Name: Sarah Reilly  YOB: 1995  Gender: female  MRN: [id number]      Procedure: Left Achilles Reconstruction With Fhl And Allograft- Prone - Left    Surgery Date: 5/14/2025    Subjective: Denies fevers chills or infection.  Denies any signs of spreading erythema.  Has been compliant with nonweightbearing.  Presents today using a iwalk for ambulation.    7/3/2025-patient states she is doing well.  She denies any significant pain.  She has been slowly increasing her weightbearing in the boot.    ROS: Patient Denies fever/chills, headache, visual changes, chest pain, shortness of breath, and nausea/vomiting/diarrhea     Exam:   Gen: AAOx3 NAD  Resp: CTAB - no wheezing  Card: RRR  Eyes: sclera non icteric    Wound healed.  No signs of dehiscence or infection.  No signs of erythema or drainage.  Neurovascularly intact of the foot.  With toes warm and well-perfused.  When laying prone and squeezing the calf the foot plantar flexes.  The resting tension of the operative ankle/foot is about 10deg more plantar flexed than the contralateral side.  Palpation of the Achilles tendon shows no gapping or signs of tearing.  No signs of sural nerve damage.  FHL, FDL, anterior tib, posterior tib intact. +silt to s/s/sp/dp/t.     Imaging:   No Imaging taken today      Assessment/Plan:    Increase weightbearing in CAM boot then slow transition to regular shoes with heel lifts.  She will remove 1 heel lift per week over the next 6 weeks.  Begin formal physical therapy    F/u 6 weeks w/o XR

## 2025-07-11 ENCOUNTER — HOSPITAL ENCOUNTER (OUTPATIENT)
Dept: PHYSICAL THERAPY | Age: 30
Setting detail: RECURRING SERIES
Discharge: HOME | End: 2025-07-13
Payer: MEDICAID

## 2025-07-11 DIAGNOSIS — R26.89 OTHER ABNORMALITIES OF GAIT AND MOBILITY: ICD-10-CM

## 2025-07-11 DIAGNOSIS — R26.89 IMBALANCE: ICD-10-CM

## 2025-07-11 DIAGNOSIS — M62.81 MUSCLE WEAKNESS (GENERALIZED): Primary | ICD-10-CM

## 2025-07-11 PROCEDURE — 97161 PT EVAL LOW COMPLEX 20 MIN: CPT

## 2025-07-11 ASSESSMENT — PAIN SCALES - GENERAL: PAINLEVEL_OUTOF10: 0

## 2025-07-11 NOTE — PROGRESS NOTES
today    Treatment     THERAPEUTIC ACTIVITY: (0 minutes): Therapeutic activities per grid below to improve mobility, strength and balance. Required visual, verbal and tactile cues to promote static and dynamic balance in sitting/standing, as well as coordination of bilateral extremities.     THERAPEUTIC EXERCISE: (0 minutes): Exercises per grid below to improve mobility, strength, and balance. Required visual, verbal and tactile cues to promote proper body alignment, posture and body mechanics. Progressed resistance, range and repetitions as indicated.    NEUROMUSCULAR RE-EDUCATION: (0 minutes): Exercise/activities per grid below to improve balance, coordination, kinesthetic sense, posture and proprioception. Required visual, verbal and tactile cues to promote static and dynamic balance in sitting/standing, as well as coordination of bilateral extremities.      Most Recent Tx:  Treatment Area:   2nd L Achilles Repair on 5/14/25 Date:   7/11/25  Post Op: week 8   Exercise    Recumbent Stepper for LE strength and endurance    SLR    Toe Yoga 2x10 B (HEP)   Arch Raises 2x10 5\"H B (HEP)   Towel Scrunches 1x20 B (HEP)   Soleus Stretch    Gastroc Stretch    Toe Raises    Heel Raises    Rocker Board    BAPS Board        Education Cross friction massage to scar and desensitization techniques with various cloth types to reduce hypersensitivity     HEP   Medbridge Access Code: [email or web address]  Exercises: toe yoga, arch raises, towel scrunches    GAIT TRAINING: (0 minutes): Gait training to improve and/or restore physical functioning as related to mobility, strength, balance and coordination. Ambulated with visual, verbal, and tactile cueing related to stance phase, stride length, push off and heel strike.     MANUAL THERAPY: (0 minutes): Joint mobilization, soft tissue mobilization and manipulation was utilized and necessary because of the patient's restricted joint motion, painful spasm, loss of articular motion and restricted

## 2025-07-11 NOTE — THERAPY EVALUATION
Sarah Reilly  : 1995  Primary: [employer] (Medicaid Managed)  Secondary:  [facility] [place] [address]  [place] [address] [place] [id number]  Phone: [phone]  Fax: [phone] Plan Frequency: 2x/week until end POC    Plan of Care/Certification Expiration Date: 10/09/25              Plan of Care/Certification Expiration Date:  Plan of Care/Certification Expiration Date: 10/09/25    Frequency/Duration:   Plan Frequency: 2x/week until end POC      Time In/Out:   Time In: 08  Time Out: 08     PT Visit Info:    Plan Frequency: 2x/week until end POC  Total # of Visits Approved: 0 (eval only)  Total # of Visits to Date: 1  Progress Note Counter: 1      Visit Count:  1                OUTPATIENT PHYSICAL THERAPY:             Initial Assessment 2025                 Episode (Chronic L Ankle Pain)         Treatment Diagnosis:     Muscle weakness (generalized)  Imbalance  Other abnormalities of gait and mobility  Medical/Referring Diagnosis:    Post-op pain  Achilles tendon tear, left, initial encounter    Referring Physician:  Pat Collado PA MD Orders:  PT Eval and Treat   Return MD Appt:  25 with Pat Collado  Date of Onset:  Onset Date: 25  Allergies:  Patient has no known allergies.    Restrictions/Precautions/Relevant PMH:    Per PA Note: \"Increase weightbearing in CAM boot then slow transition to regular shoes with heel lifts. She will remove 1 heel lift per week over the next 6 weeks.\"  Main goal: get back to running for enjoyment  8 weeks gestation at initial evaluation       Medications Last Reviewed:  2025       SUBJECTIVE     History of Injury/Illness (Reason for Referral):  25: Sarah Reilly relates her original surgery was 25 and she re ruptured and had additional surgery for achilles repair 25. She has been in the CAM boot since. Instructed to remove one heel lift ea week over 6 weeks. She relays

## 2025-07-15 ENCOUNTER — PROCEDURE VISIT (OUTPATIENT)
Dept: OBGYN CLINIC | Age: 30
End: 2025-07-15
Payer: MEDICAID

## 2025-07-15 ENCOUNTER — OFFICE VISIT (OUTPATIENT)
Dept: OBGYN CLINIC | Age: 30
End: 2025-07-15
Payer: MEDICAID

## 2025-07-15 VITALS
SYSTOLIC BLOOD PRESSURE: 116 MMHG | BODY MASS INDEX: 28.38 KG/M2 | DIASTOLIC BLOOD PRESSURE: 74 MMHG | HEIGHT: 66 IN | WEIGHT: 176.6 LBS

## 2025-07-15 DIAGNOSIS — N92.6 MISSED MENSES: Primary | ICD-10-CM

## 2025-07-15 DIAGNOSIS — Z32.01 PREGNANCY TEST POSITIVE: ICD-10-CM

## 2025-07-15 LAB
HCG, PREGNANCY, URINE, POC: POSITIVE
VALID INTERNAL CONTROL, POC: YES

## 2025-07-15 PROCEDURE — 76830 TRANSVAGINAL US NON-OB: CPT | Performed by: OBSTETRICS & GYNECOLOGY

## 2025-07-15 PROCEDURE — 81025 URINE PREGNANCY TEST: CPT | Performed by: NURSE PRACTITIONER

## 2025-07-15 PROCEDURE — 99214 OFFICE O/P EST MOD 30 MIN: CPT | Performed by: NURSE PRACTITIONER

## 2025-07-15 RX ORDER — LAMOTRIGINE 100 MG/1
TABLET ORAL
COMMUNITY

## 2025-07-15 NOTE — PROGRESS NOTES
The patient is a 29 y.o.  who is seen to discuss her new pregnancy. Pt denies any current unilateral pain, cramping, urinary symptoms, or vaginal bleeding. She is currently taking an over the counter PNV with DHA and folic acid. Currently on Lamictal for BPD... is waiting on PCP for recommendations for alternative in pregnancy.      LMP: 2025  FLORENTIN based off of LMP: 2026   GA today: 8w6d     US findings from today 7/15/25:  Transvaginal Preg Confirmation   FHT= 171 bpm   CRL= LMP   YS- visualized   ROV- hemorrhagic clc 2.2 x 1.5 x 2.1 cm   LOV- not visualized   UT- anteverted and heterogeneous, sabrina visualized measuring 0.9 x 0.2 x 1.1 cm, pt made aware and is asymptomatic   CX- WNL   Rt adn- prominent vessels with clot noted in some of the vessels   Lt adn- prominent vessels       HISTORY:      Patient's last menstrual period was 2025 (exact date).    Current Outpatient Medications on File Prior to Visit   Medication Sig Dispense Refill    lamoTRIgine (LAMICTAL) 100 MG tablet TAKE 1 TABLET BY MOUTH IN THE MORNING. DO NOT START UNTIL FINISHED TITRATION ON THE 25MG TABLETS.      hydrOXYzine HCl (ATARAX) 10 MG tablet 1-2 tablets as needed for Anxiety Takes 2 every morning      sertraline (ZOLOFT) 100 MG tablet Take 1 tablet by mouth daily      acyclovir (ZOVIRAX) 400 MG tablet Take one tablet by oral route twice daily as needed for genital outbreak 30 tablet 2    Prenatal Multivit-Min-Fe-FA (PRENATAL 1 + IRON PO) Take by mouth Daily      ondansetron (ZOFRAN) 4 MG tablet Take 1 tablet by mouth daily as needed for Nausea or Vomiting (Patient not taking: Reported on 7/15/2025) 30 tablet 0    aspirin (ASPIRIN 81) 81 MG EC tablet Take 1 tablet by mouth in the morning and at bedtime for 21 days (Patient not taking: Reported on 2025) 42 tablet 0    acetaminophen (TYLENOL) 500 MG tablet Take 2 tablets by mouth every 6 hours as needed for Pain (Patient not taking: Reported on 7/15/2025)

## 2025-07-17 ENCOUNTER — APPOINTMENT (OUTPATIENT)
Dept: PHYSICAL THERAPY | Age: 30
End: 2025-07-17
Payer: MEDICAID

## 2025-07-22 ENCOUNTER — APPOINTMENT (OUTPATIENT)
Dept: PHYSICAL THERAPY | Age: 30
End: 2025-07-22
Payer: MEDICAID

## 2025-07-24 ENCOUNTER — APPOINTMENT (OUTPATIENT)
Dept: PHYSICAL THERAPY | Age: 30
End: 2025-07-24
Payer: MEDICAID

## 2025-07-25 ENCOUNTER — HOSPITAL ENCOUNTER (OUTPATIENT)
Dept: PHYSICAL THERAPY | Age: 30
Setting detail: RECURRING SERIES
Discharge: HOME | End: 2025-07-27
Payer: MEDICAID

## 2025-07-25 PROCEDURE — 97110 THERAPEUTIC EXERCISES: CPT

## 2025-07-25 NOTE — PROGRESS NOTES
Sarah Reilly  : 1995  Primary: [employer] (Medicaid Managed)  Secondary:  [facility] @ [place] [address] [place] [address] [place] [id number]  Phone: [phone]  Fax: [phone] Plan Frequency: 2x/week until end POC    Plan of Care/Certification Expiration Date: 10/09/25            Plan of Care/Certification Expiration Date:  Plan of Care/Certification Expiration Date: 10/09/25    Frequency/Duration:   Plan Frequency: 2x/week until end POC      Time In/Out:       9097-1063    PT Visit Info:    Plan Frequency: 2x/week until end POC  Total # of Visits Approved: 0 (eval only)  Total # of Visits to Date: 2  Progress Note Counter: 2      Visit Count:  2    OUTPATIENT PHYSICAL THERAPY:   Treatment Note 2025       Charge Capture        Episode  (Chronic L Ankle Pain)               Treatment Diagnosis:    Muscle weakness (generalized)  Imbalance  Other abnormalities of gait and mobility  Medical/Referring Diagnosis:   Post-op pain  Achilles tendon tear, left, initial encounter   Referring Physician: Pat Collado PA MD Orders: PT Eval and Treat   Return MD Appt:  25 with Pat Collado   Date of Onset: Onset Date: 25  Allergies: Patient has no known allergies.  Interventions Planned (Treatment may consist of any combination of the following): See Assessment Note    Restrictions/Precautions/Relevant PMH:   Post op achilles repair (after re-rupture) 25  Per PA Note: \"Increase weightbearing in CAM boot then slow transition to regular shoes with heel lifts. She will remove 1 heel lift per week over the next 6 weeks.\"  Main goal: get back to running for enjoyment  8 weeks pregnant at initial evaluation     Subjective Comments: Pt. Removes one heel lift today in therapy today and is still in boot at this time.       Initial Pain Level:      2/10   Post Session Pain Level:        10     Medications Last Reviewed: 2025    Updated Objective

## 2025-07-29 ENCOUNTER — APPOINTMENT (OUTPATIENT)
Dept: PHYSICAL THERAPY | Age: 30
End: 2025-07-29
Payer: MEDICAID

## 2025-07-31 ENCOUNTER — APPOINTMENT (OUTPATIENT)
Dept: PHYSICAL THERAPY | Age: 30
End: 2025-07-31
Payer: MEDICAID

## 2025-08-01 ENCOUNTER — HOSPITAL ENCOUNTER (OUTPATIENT)
Dept: PHYSICAL THERAPY | Age: 30
Setting detail: RECURRING SERIES
Discharge: HOME | End: 2025-08-03
Payer: MEDICAID

## 2025-08-01 PROCEDURE — 97110 THERAPEUTIC EXERCISES: CPT

## 2025-08-01 PROCEDURE — 97016 VASOPNEUMATIC DEVICE THERAPY: CPT

## 2025-08-05 ENCOUNTER — APPOINTMENT (OUTPATIENT)
Dept: PHYSICAL THERAPY | Age: 30
End: 2025-08-05
Payer: MEDICAID

## 2025-08-06 ENCOUNTER — HOSPITAL ENCOUNTER (OUTPATIENT)
Dept: PHYSICAL THERAPY | Age: 30
Setting detail: RECURRING SERIES
End: 2025-08-06
Payer: MEDICAID

## 2025-08-07 ENCOUNTER — APPOINTMENT (OUTPATIENT)
Dept: PHYSICAL THERAPY | Age: 30
End: 2025-08-07
Payer: MEDICAID

## 2025-08-08 ENCOUNTER — APPOINTMENT (OUTPATIENT)
Dept: PHYSICAL THERAPY | Age: 30
End: 2025-08-08
Payer: MEDICAID

## 2025-08-18 ENCOUNTER — INITIAL PRENATAL (OUTPATIENT)
Dept: OBGYN CLINIC | Age: 30
End: 2025-08-18

## 2025-08-18 VITALS — HEIGHT: 66 IN | BODY MASS INDEX: 29.78 KG/M2 | WEIGHT: 185.3 LBS

## 2025-08-18 DIAGNOSIS — Z3A.13 13 WEEKS GESTATION OF PREGNANCY: ICD-10-CM

## 2025-08-18 DIAGNOSIS — F32.A ANXIETY AND DEPRESSION: ICD-10-CM

## 2025-08-18 DIAGNOSIS — Z34.82 PRENATAL CARE, SUBSEQUENT PREGNANCY, SECOND TRIMESTER: Primary | ICD-10-CM

## 2025-08-18 DIAGNOSIS — F41.9 ANXIETY AND DEPRESSION: ICD-10-CM

## 2025-08-18 DIAGNOSIS — O26.892 RH NEGATIVE STATUS DURING PREGNANCY IN SECOND TRIMESTER: ICD-10-CM

## 2025-08-18 DIAGNOSIS — Z67.91 RH NEGATIVE STATUS DURING PREGNANCY IN SECOND TRIMESTER: ICD-10-CM

## 2025-08-18 DIAGNOSIS — Z34.82 PRENATAL CARE, SUBSEQUENT PREGNANCY, SECOND TRIMESTER: ICD-10-CM

## 2025-08-18 DIAGNOSIS — Z34.82 ENCOUNTER FOR SUPERVISION OF OTHER NORMAL PREGNANCY, SECOND TRIMESTER: ICD-10-CM

## 2025-08-18 PROBLEM — S86.012A ACHILLES TENDON TEAR, LEFT, INITIAL ENCOUNTER: Status: RESOLVED | Noted: 2025-02-07 | Resolved: 2025-08-18

## 2025-08-18 PROBLEM — M76.62 LEFT ACHILLES TENDINITIS: Status: RESOLVED | Noted: 2025-05-07 | Resolved: 2025-08-18

## 2025-08-18 PROBLEM — Z37.9 NORMAL LABOR: Status: RESOLVED | Noted: 2024-08-18 | Resolved: 2025-08-18

## 2025-08-18 PROBLEM — B95.1 POSITIVE GBS TEST: Status: RESOLVED | Noted: 2024-07-29 | Resolved: 2025-08-18

## 2025-08-18 PROBLEM — D64.9 ANEMIA: Status: RESOLVED | Noted: 2024-05-31 | Resolved: 2025-08-18

## 2025-08-18 PROBLEM — G89.18 POST-OP PAIN: Status: RESOLVED | Noted: 2025-03-06 | Resolved: 2025-08-18

## 2025-08-18 LAB
ABO + RH BLD: NORMAL
BASOPHILS # BLD: 0.02 K/UL (ref 0–0.2)
BASOPHILS NFR BLD: 0.3 % (ref 0–2)
BLOOD GROUP ANTIBODIES SERPL: NORMAL
DIFFERENTIAL METHOD BLD: ABNORMAL
EOSINOPHIL # BLD: 0.09 K/UL (ref 0–0.8)
EOSINOPHIL NFR BLD: 1.3 % (ref 0.5–7.8)
ERYTHROCYTE [DISTWIDTH] IN BLOOD BY AUTOMATED COUNT: 12.7 % (ref 11.9–14.6)
HCT VFR BLD AUTO: 34.6 % (ref 35.8–46.3)
HGB BLD-MCNC: 11 G/DL (ref 11.7–15.4)
IMM GRANULOCYTES # BLD AUTO: 0.02 K/UL (ref 0–0.5)
IMM GRANULOCYTES NFR BLD AUTO: 0.3 % (ref 0–5)
LYMPHOCYTES # BLD: 1.85 K/UL (ref 0.5–4.6)
LYMPHOCYTES NFR BLD: 26.6 % (ref 13–44)
MCH RBC QN AUTO: 27.4 PG (ref 26.1–32.9)
MCHC RBC AUTO-ENTMCNC: 31.8 G/DL (ref 31.4–35)
MCV RBC AUTO: 86.1 FL (ref 82–102)
MONOCYTES # BLD: 0.31 K/UL (ref 0.1–1.3)
MONOCYTES NFR BLD: 4.5 % (ref 4–12)
NEUTS SEG # BLD: 4.66 K/UL (ref 1.7–8.2)
NEUTS SEG NFR BLD: 67 % (ref 43–78)
NRBC # BLD: 0 K/UL (ref 0–0.2)
PLATELET # BLD AUTO: 367 K/UL (ref 150–450)
PMV BLD AUTO: 9.8 FL (ref 9.4–12.3)
RBC # BLD AUTO: 4.02 M/UL (ref 4.05–5.2)
WBC # BLD AUTO: 7 K/UL (ref 4.3–11.1)

## 2025-08-19 LAB
EST. AVERAGE GLUCOSE BLD GHB EST-MCNC: 107 MG/DL
HBA1C MFR BLD: 5.4 % (ref 0–5.6)
HIV 1+2 AB+HIV1 P24 AG SERPL QL IA: NONREACTIVE
HIV 1/2 RESULT COMMENT: NORMAL
RUBV IGG SERPL IA-ACNC: 58.5 IU/ML
T PALLIDUM AB SER QL IA: NONREACTIVE

## 2025-08-20 LAB
BACTERIA SPEC CULT: NORMAL
HBV SURFACE AG SERPL QL IA: NEGATIVE
HCV AB SERPL QL IA: NORMAL
HCV IGG SERPL QL IA: NON REACTIVE S/CO RATIO
SERVICE CMNT-IMP: NORMAL
VZV IGG SER IA-ACNC: REACTIVE

## 2025-08-21 RX ORDER — ACYCLOVIR 400 MG/1
TABLET ORAL
Qty: 30 TABLET | Refills: 1 | Status: SHIPPED | OUTPATIENT
Start: 2025-08-21

## 2025-08-22 ENCOUNTER — HOSPITAL ENCOUNTER (OUTPATIENT)
Dept: PHYSICAL THERAPY | Age: 30
Setting detail: RECURRING SERIES
Discharge: HOME | End: 2025-08-24
Payer: MEDICAID

## 2025-08-22 LAB — HGB FRACT BLD ELPH-IMP: NORMAL

## 2025-08-22 PROCEDURE — 97110 THERAPEUTIC EXERCISES: CPT

## 2025-08-22 PROCEDURE — 97140 MANUAL THERAPY 1/> REGIONS: CPT

## 2025-08-22 ASSESSMENT — PAIN SCALES - GENERAL: PAINLEVEL_OUTOF10: 2

## 2025-08-25 ENCOUNTER — HOSPITAL ENCOUNTER (OUTPATIENT)
Dept: PHYSICAL THERAPY | Age: 30
Setting detail: RECURRING SERIES
Discharge: HOME | End: 2025-08-27
Payer: MEDICAID

## 2025-08-25 LAB
Lab: NORMAL
NTRA FETAL FRACTION: NORMAL
NTRA FETAL RHD SUMMARY: NORMAL
NTRA GENDER OF FETUS: NORMAL
NTRA MONOSOMY X AGE-BASED RISK TEXT: NORMAL
NTRA MONOSOMY X RESULT TEXT: NORMAL
NTRA MONOSOMY X RISK SCORE TEXT: NORMAL
NTRA TRIPLOIDY RESULT TEXT: NORMAL
NTRA TRISOMY 13 AGE-BASED RISK TEXT: NORMAL
NTRA TRISOMY 13 RESULT TEXT: NORMAL
NTRA TRISOMY 13 RISK SCORE TEXT: NORMAL
NTRA TRISOMY 18 AGE-BASED RISK TEXT: NORMAL
NTRA TRISOMY 18 RESULT TEXT: NORMAL
NTRA TRISOMY 18 RISK SCORE TEXT: NORMAL
NTRA TRISOMY 21 AGE-BASED RISK TEXT: NORMAL
NTRA TRISOMY 21 RESULT TEXT: NORMAL
NTRA TRISOMY 21 RISK SCORE TEXT: NORMAL

## 2025-08-25 PROCEDURE — 97110 THERAPEUTIC EXERCISES: CPT

## 2025-08-27 ENCOUNTER — ROUTINE PRENATAL (OUTPATIENT)
Dept: OBGYN CLINIC | Age: 30
End: 2025-08-27
Payer: MEDICAID

## 2025-08-27 ENCOUNTER — OFFICE VISIT (OUTPATIENT)
Dept: ORTHOPEDIC SURGERY | Age: 30
End: 2025-08-27
Payer: MEDICAID

## 2025-08-27 VITALS — WEIGHT: 189.7 LBS | SYSTOLIC BLOOD PRESSURE: 108 MMHG | BODY MASS INDEX: 30.62 KG/M2 | DIASTOLIC BLOOD PRESSURE: 62 MMHG

## 2025-08-27 DIAGNOSIS — G89.18 POST-OP PAIN: Primary | ICD-10-CM

## 2025-08-27 DIAGNOSIS — Z3A.15 15 WEEKS GESTATION OF PREGNANCY: ICD-10-CM

## 2025-08-27 DIAGNOSIS — Z13.89 SCREENING FOR GENITOURINARY CONDITION: ICD-10-CM

## 2025-08-27 DIAGNOSIS — Z11.3 SCREENING FOR STD (SEXUALLY TRANSMITTED DISEASE): ICD-10-CM

## 2025-08-27 DIAGNOSIS — Z34.92 PRENATAL CARE, SECOND TRIMESTER: Primary | ICD-10-CM

## 2025-08-27 LAB
GLUCOSE URINE, POC: NEGATIVE
PROTEIN,URINE, POC: NEGATIVE

## 2025-08-27 PROCEDURE — 99214 OFFICE O/P EST MOD 30 MIN: CPT | Performed by: OBSTETRICS & GYNECOLOGY

## 2025-08-27 PROCEDURE — 99213 OFFICE O/P EST LOW 20 MIN: CPT | Performed by: PHYSICIAN ASSISTANT

## 2025-08-29 ENCOUNTER — HOSPITAL ENCOUNTER (OUTPATIENT)
Dept: PHYSICAL THERAPY | Age: 30
Setting detail: RECURRING SERIES
Discharge: HOME | End: 2025-08-31
Payer: MEDICAID

## 2025-08-29 PROCEDURE — 97140 MANUAL THERAPY 1/> REGIONS: CPT

## 2025-08-29 PROCEDURE — 97110 THERAPEUTIC EXERCISES: CPT

## 2025-08-29 ASSESSMENT — PAIN SCALES - GENERAL: PAINLEVEL_OUTOF10: 2

## 2025-08-30 LAB
C TRACH RRNA SPEC QL NAA+PROBE: NEGATIVE
N GONORRHOEA RRNA SPEC QL NAA+PROBE: NEGATIVE
SPECIMEN SOURCE: NORMAL
T VAGINALIS RRNA SPEC QL NAA+PROBE: NEGATIVE

## (undated) DEVICE — CLOTH PRE OP W9XL10.5IN 2% CHG FRAGRANCE RNS FREE READYPREP

## (undated) DEVICE — SUTURE VICRYL SZ 2-0 L27IN ABSRB UD L26MM CT-2 1/2 CIR J269H

## (undated) DEVICE — SUTURE PROL SZ 3-0 L18IN NONABSORBABLE BLU L19MM PS-2 3/8 8687H

## (undated) DEVICE — GLOVE SURG SZ 85 L12IN FNGR ORTHO 126MIL CRM LTX FREE

## (undated) DEVICE — GLOVE SURG SZ 8 L12IN FNGR THK79MIL GRN LTX FREE

## (undated) DEVICE — PAD,ABDOMINAL,5"X9",ST,LF,25/BX: Brand: MEDLINE INDUSTRIES, INC.

## (undated) DEVICE — SOLUTION IRRIG 1000ML H2O PIC PLAS SHATTERPROOF CONTAINER

## (undated) DEVICE — STERILE PVP: Brand: MEDLINE INDUSTRIES, INC.

## (undated) DEVICE — ZIMMER® STERILE DISPOSABLE TOURNIQUET CUFF WITH PLC, DUAL PORT, SINGLE BLADDER, 30 IN. (76 CM)

## (undated) DEVICE — BNDG,ELSTC,MATRIX,STRL,4"X5YD,LF,HOOK&LP: Brand: MEDLINE

## (undated) DEVICE — SUTURE MONOCRYL SZ 3-0 L27IN ABSRB UD L19MM PS-2 3/8 CIR PRIM Y427H

## (undated) DEVICE — KIT INSTR W/ 2.4MM GUIDEPIN SUT PASS WIRE NO2 FIBERWIRE(ORDER MULTIPLES OF 5 EA)

## (undated) DEVICE — Device

## (undated) DEVICE — BNDG,ELSTC,MATRIX,STRL,6"X5YD,LF,HOOK&LP: Brand: MEDLINE

## (undated) DEVICE — FOOT ANKLE PACK: Brand: MEDLINE INDUSTRIES, INC.

## (undated) DEVICE — DRESSING PETRO W3XL8IN OIL EMUL N ADH GZ KNIT IMPREG CELOS

## (undated) DEVICE — GLOVE ORANGE PI 7 1/2   MSG9075

## (undated) DEVICE — GOWN,SIRUS,NONRNF,SETINSLV,XL,20/CS: Brand: MEDLINE

## (undated) DEVICE — LARGE TEAR CROSS CUT RASP (14.0 X 7.0MM)

## (undated) DEVICE — BASIC SINGLE BASIN 2-LF: Brand: MEDLINE INDUSTRIES, INC.

## (undated) DEVICE — NEPTUNE E-SEP SMOKE EVACUATION PENCIL, COATED, 70MM BLADE, PUSH BUTTON SWITCH: Brand: NEPTUNE E-SEP